# Patient Record
Sex: MALE
[De-identification: names, ages, dates, MRNs, and addresses within clinical notes are randomized per-mention and may not be internally consistent; named-entity substitution may affect disease eponyms.]

---

## 2018-11-24 ENCOUNTER — HOSPITAL ENCOUNTER (EMERGENCY)
Dept: HOSPITAL 97 - ER | Age: 83
Discharge: HOME | End: 2018-11-24
Payer: COMMERCIAL

## 2018-11-24 VITALS — SYSTOLIC BLOOD PRESSURE: 158 MMHG | OXYGEN SATURATION: 100 % | TEMPERATURE: 98.6 F | DIASTOLIC BLOOD PRESSURE: 78 MMHG

## 2018-11-24 DIAGNOSIS — J33.9: Primary | ICD-10-CM

## 2018-11-24 DIAGNOSIS — R05: ICD-10-CM

## 2018-11-24 PROCEDURE — 99282 EMERGENCY DEPT VISIT SF MDM: CPT

## 2018-11-24 NOTE — ER
Nurse's Notes                                                                                     

 [facility]                                                                

Name: Zofia Rudd Jr                                                                              

Age: 82 yrs                                                                                       

Sex: Male                                                                                         

: 1935                                                                                   

MRN: [id number]                                                                                   

Arrival Date: 2018                                                                          

Time: 15:49                                                                                       

Account#: [id number]                                                                            

Bed 13                                                                                            

Private MD:                                                                                       

Diagnosis: Cough;Nasal polyp, unspecified                                                         

                                                                                                  

Presentation:                                                                                     

                                                                                             

15:54 Presenting complaint: Patient states: When I blow my nose something pops out of my left la1 

      nostril. Transition of care: patient was not received from another setting of care.         

      Onset of symptoms was 2018. Risk Assessment: Do you want to hurt yourself      

      or someone else? Patient reports no desire to harm self or others. Initial Sepsis           

      Screen: Does the patient meet any 2 criteria? No. Patient's initial sepsis screen is        

      negative. Does the patient have a suspected source of infection? No. Patient's initial      

      sepsis screen is negative. Care prior to arrival: None.                                     

15:54 Method Of Arrival: Ambulatory                                                           la1 

15:54 Acuity: SHARA 4                                                                           la1 

                                                                                                  

Historical:                                                                                       

- Allergies:                                                                                      

15:55 No Known Allergies;                                                                     la1 

- PMHx:                                                                                           

15:55 Hypertension;                                                                           la1 

                                                                                                  

- Immunization history:: Adult Immunizations up to date.                                          

- Social history:: Smoking status: Patient/guardian denies using tobacco.                         

- Ebola Screening: : No symptoms or risks identified at this time.                                

                                                                                                  

                                                                                                  

Screenin:30 Abuse screen: Denies threats or abuse. Denies injuries from another. Nutritional        jl7 

      screening: No deficits noted. Tuberculosis screening: No symptoms or risk factors           

      identified. Fall Risk None identified.                                                      

                                                                                                  

Assessment:                                                                                       

17:30 General: Appears in no apparent distress. comfortable, Behavior is calm, cooperative,   jl7 

      appropriate for age. Pain: Denies pain. Neuro: Level of Consciousness is awake, alert,      

      obeys commands, Oriented to person, place, time, situation. Cardiovascular: Patient's       

      skin is warm and dry. Respiratory: Airway is patent Respiratory effort is even,             

      unlabored, Respiratory pattern is regular, symmetrical. EENT: Nares are clear               

      bilaterally. Derm: Skin is pink, warm \T\ dry.                                              

                                                                                                  

Vital Signs:                                                                                      

15:55  / 78; Pulse 86; Resp 16; Temp 98.6; Pulse Ox 100% on R/A; Weight 104.33 kg;      la1 

      Height 5 ft. 8 in. (172.72 cm);                                                             

15:55 Body Mass Index 34.97 (104.33 kg, 172.72 cm)                                            la1 

                                                                                                  

ED Course:                                                                                        

15:49 Patient arrived in ED.                                                                  mr  

15:55 Triage completed.                                                                       la1 

15:55 Arm band placed on left wrist.                                                          la1 

17:13 Nicole Fitzgerald FNP-C is [facility].                                                        kb  

17:13 Vasu Aviles MD is Attending Physician.                                              kb  

17:30 Christiana Mauricio, RN is Primary Nurse.                                                      jl7 

17:30 Patient has correct armband on for positive identification. Placed in gown. Bed in low  jl7 

      position. Call light in reach. Side rails up X 1.                                           

17:30 No provider procedures requiring assistance completed. Patient did not have IV access   jl7 

      during this emergency room visit.                                                           

                                                                                                  

Administered Medications:                                                                         

No medications were administered                                                                  

                                                                                                  

                                                                                                  

Outcome:                                                                                          

17:24 Discharge ordered by MD.                                                                kb  

17:30 Discharged to home ambulatory, with family.                                             jl7 

17:30 Condition: stable                                                                           

17:30 Discharge instructions given to patient, family, Instructed on discharge instructions,      

      follow up and referral plans. medication usage, Demonstrated understanding of               

      instructions, follow-up care, medications, Prescriptions given X 1.                         

17:32 Patient left the ED.                                                                    jl7 

                                                                                                  

Signatures:                                                                                       

Nicole Fitzgerald FNP-C FNP-Angella                                                   

Stephanie Smith, Tramaine, RN                         RN   la1                                                  

Christiana Mauricio, KATHY                        RN   jl7                                                  

                                                                                                  

**************************************************************************************************

## 2018-11-24 NOTE — EDPHYS
Physician Documentation                                                                           

 [facility]                                                                

Name: Zofia Rudd Jr                                                                              

Age: 82 yrs                                                                                       

Sex: Male                                                                                         

: 1935                                                                                   

MRN: [id number]                                                                                   

Arrival Date: 2018                                                                          

Time: 15:49                                                                                       

Account#: [id number]                                                                            

Bed 13                                                                                            

Private MD:                                                                                       

ED Physician Vasu Aviles                                                                       

HPI:                                                                                              

                                                                                             

17:26 This 82 yrs old  Male presents to ER via Ambulatory with complaints of Nose     kb  

      Problem.                                                                                    

17:26 The patient presents with a foreign body, unknown, located in left nare. Onset: The     kb  

      symptoms/episode began/occurred nasal obstruction for approx 3 months. Blew his nose        

      today and a piece of flesh colored tissue hung out of left nostril.. Modifying factors:     

      The symptoms are alleviated by nothing. the symptoms are aggravated by blowing nose.        

      Associated signs and symptoms: The patient has no apparent associated signs or              

      symptoms, Loss of consciousness: the patient experienced no loss of consciousness.          

      Severity of symptoms: At their worst the symptoms were mild in the emergency department     

      the symptoms are unchanged. The patient has not experienced similar symptoms in the         

      past. The patient has not recently seen a physician.                                        

                                                                                                  

Historical:                                                                                       

- Allergies:                                                                                      

15:55 No Known Allergies;                                                                     la1 

- PMHx:                                                                                           

15:55 Hypertension;                                                                           la1 

                                                                                                  

- Immunization history:: Adult Immunizations up to date.                                          

- Social history:: Smoking status: Patient/guardian denies using tobacco.                         

- Ebola Screening: : No symptoms or risks identified at this time.                                

                                                                                                  

                                                                                                  

ROS:                                                                                              

17:24 Constitutional: Negative for fever, chills, and weight loss, Neck: Negative for injury, kb  

      pain, and swelling, Cardiovascular: Negative for chest pain, palpitations, and edema,       

      Abdomen/GI: Negative for abdominal pain, nausea, vomiting, diarrhea, and constipation,      

      Back: Negative for injury and pain, MS/Extremity: Negative for injury and deformity,        

      Skin: Negative for injury, rash, and discoloration, Neuro: Negative for headache,           

      weakness, numbness, tingling, and seizure.                                                  

17:24 ENT: Positive for nasal obstruction.                                                        

17:24 Respiratory: Positive for cough, Negative for dyspnea on exertion, hemoptysis,              

      orthopnea, pleurisy, shortness of breath, sputum production, wheezing.                      

                                                                                                  

Exam:                                                                                             

17:24 Constitutional:  This is a well developed, well nourished patient who is awake, alert,  kb  

      and in no acute distress. Head/Face:  Normocephalic, atraumatic. Chest/axilla:  Normal      

      chest wall appearance and motion.  Nontender with no deformity.  No lesions are             

      appreciated. Cardiovascular:  Regular rate and rhythm with a normal S1 and S2.  No          

      gallops, murmurs, or rubs.  Normal PMI, no JVD.  No pulse deficits. Respiratory:  Lungs     

      have equal breath sounds bilaterally, clear to auscultation and percussion.  No rales,      

      rhonchi or wheezes noted.  No increased work of breathing, no retractions or nasal          

      flaring. Abdomen/GI:  Soft, non-tender, with normal bowel sounds.  No distension or         

      tympany.  No guarding or rebound.  No evidence of tenderness throughout. Skin:  Warm,       

      dry with normal turgor.  Normal color with no rashes, no lesions, and no evidence of        

      cellulitis. MS/ Extremity:  Pulses equal, no cyanosis.  Neurovascular intact.  Full,        

      normal range of motion. Neuro:  Awake and alert, GCS 15, oriented to person, place,         

      time, and situation.  Cranial nerves II-XII grossly intact.  Motor strength 5/5 in all      

      extremities.  Sensory grossly intact.  Cerebellar exam normal.  Normal gait.                

17:24 ENT: Nose: polyp noted to left nare.                                                        

                                                                                                  

Vital Signs:                                                                                      

15:55  / 78; Pulse 86; Resp 16; Temp 98.6; Pulse Ox 100% on R/A; Weight 104.33 kg;      la1 

      Height 5 ft. 8 in. (172.72 cm);                                                             

15:55 Body Mass Index 34.97 (104.33 kg, 172.72 cm)                                            la1 

                                                                                                  

MDM:                                                                                              

17:13 Patient medically screened.                                                             kb  

17:25 Data reviewed: vital signs, nurses notes. Data interpreted: Pulse oximetry: on room air kb  

      is 100 %. Interpretation: normal. Counseling: I had a detailed discussion with the          

      patient and/or guardian regarding: the historical points, exam findings, and any            

      diagnostic results supporting the discharge/admit diagnosis, the need for outpatient        

      follow up, an ENT specialist, to return to the emergency department if symptoms worsen      

      or persist or if there are any questions or concerns that arise at home.                    

                                                                                                  

Administered Medications:                                                                         

No medications were administered                                                                  

                                                                                                  

                                                                                                  

Disposition:                                                                                      

18:18 Co-signature as Attending Physician, Vasu Aviles MD I agree with the assessment and   kdr 

      plan of care.                                                                               

                                                                                                  

Disposition:                                                                                      

11/24/18 17:24 Discharged to Home. Impression: Cough, Nasal polyp, unspecified.                   

- Condition is Stable.                                                                            

- Discharge Instructions: Cough, Adult, Easy-to-Read, Nasal Polyps.                               

- Prescriptions for Tessalon Perles 100 mg Oral Capsule - take 1 capsule by ORAL route            

  every 8 hours As needed; 15 capsule.                                                            

- Medication Reconciliation Form, Thank You Letter, Antibiotic Education, Prescription            

  Opioid Use form.                                                                                

- Follow up: Emergency Department; When: As needed; Reason: Worsening of condition.               

  Follow up: Private Physician; When: 2 - 3 days; Reason: Recheck today's complaints,             

  Continuance of care, Re-evaluation by your physician.                                           

                                                                                                  

                                                                                                  

                                                                                                  

Signatures:                                                                                       

Nicole Fitzgerald, FNP-C                 FNP-Ckb                                                   

Vasu Aviles MD MD kdr Attema, Lee, RN                         RN   la1                                                  

Christiana Mauricio RN                        RN   jl7                                                  

                                                                                                  

Corrections: (The following items were deleted from the chart)                                    

17:32 17:24 2018 17:24 Discharged to Home. Impression: Cough; Nasal polyp, unspecified. jl7 

      Condition is Stable. Forms are Medication Reconciliation Form, Thank You Letter,            

      Antibiotic Education, Prescription Opioid Use. Follow up: Emergency Department; When:       

      As needed; Reason: Worsening of condition. Follow up: Private Physician; When: 2 - 3        

      days; Reason: Recheck today's complaints, Continuance of care, Re-evaluation by your        

      physician. kb                                                                               

                                                                                                  

**************************************************************************************************

## 2021-07-09 ENCOUNTER — HOSPITAL ENCOUNTER (INPATIENT)
Dept: HOSPITAL 97 - ER | Age: 86
LOS: 4 days | Discharge: HOME | DRG: 190 | End: 2021-07-13
Attending: HOSPITALIST
Payer: COMMERCIAL

## 2021-07-09 VITALS — BODY MASS INDEX: 34.8 KG/M2

## 2021-07-09 DIAGNOSIS — I13.0: ICD-10-CM

## 2021-07-09 DIAGNOSIS — N17.9: ICD-10-CM

## 2021-07-09 DIAGNOSIS — N18.4: ICD-10-CM

## 2021-07-09 DIAGNOSIS — E87.2: ICD-10-CM

## 2021-07-09 DIAGNOSIS — C90.00: ICD-10-CM

## 2021-07-09 DIAGNOSIS — E83.51: ICD-10-CM

## 2021-07-09 DIAGNOSIS — E78.5: ICD-10-CM

## 2021-07-09 DIAGNOSIS — I48.91: ICD-10-CM

## 2021-07-09 DIAGNOSIS — D63.8: ICD-10-CM

## 2021-07-09 DIAGNOSIS — Z20.822: ICD-10-CM

## 2021-07-09 DIAGNOSIS — I24.8: ICD-10-CM

## 2021-07-09 DIAGNOSIS — E66.9: ICD-10-CM

## 2021-07-09 DIAGNOSIS — J44.1: Primary | ICD-10-CM

## 2021-07-09 DIAGNOSIS — I50.31: ICD-10-CM

## 2021-07-09 LAB
ALBUMIN SERPL BCP-MCNC: 3.4 G/DL (ref 3.4–5)
ALP SERPL-CCNC: 72 U/L (ref 45–117)
ALT SERPL W P-5'-P-CCNC: 20 U/L (ref 12–78)
AST SERPL W P-5'-P-CCNC: 10 U/L (ref 15–37)
BUN BLD-MCNC: 88 MG/DL (ref 7–18)
GLUCOSE SERPLBLD-MCNC: 101 MG/DL (ref 74–106)
HCT VFR BLD CALC: 31.6 % (ref 39.6–49)
INR BLD: 1.03
LYMPHOCYTES # SPEC AUTO: 2 K/UL (ref 0.7–4.9)
MAGNESIUM SERPL-MCNC: 2.3 MG/DL (ref 1.8–2.4)
NT-PROBNP SERPL-MCNC: 4416 PG/ML (ref ?–450)
PMV BLD: 11.3 FL (ref 7.6–11.3)
POTASSIUM SERPL-SCNC: 4.4 MMOL/L (ref 3.5–5.1)
RBC # BLD: 3.4 M/UL (ref 4.33–5.43)
TROPONIN (EMERG DEPT USE ONLY): 0.1 NG/ML (ref 0–0.04)

## 2021-07-09 PROCEDURE — 80053 COMPREHEN METABOLIC PANEL: CPT

## 2021-07-09 PROCEDURE — 83970 ASSAY OF PARATHORMONE: CPT

## 2021-07-09 PROCEDURE — 81001 URINALYSIS AUTO W/SCOPE: CPT

## 2021-07-09 PROCEDURE — 96374 THER/PROPH/DIAG INJ IV PUSH: CPT

## 2021-07-09 PROCEDURE — 71045 X-RAY EXAM CHEST 1 VIEW: CPT

## 2021-07-09 PROCEDURE — 83540 ASSAY OF IRON: CPT

## 2021-07-09 PROCEDURE — 82728 ASSAY OF FERRITIN: CPT

## 2021-07-09 PROCEDURE — 80048 BASIC METABOLIC PNL TOTAL CA: CPT

## 2021-07-09 PROCEDURE — 94010 BREATHING CAPACITY TEST: CPT

## 2021-07-09 PROCEDURE — 82805 BLOOD GASES W/O2 SATURATION: CPT

## 2021-07-09 PROCEDURE — 84100 ASSAY OF PHOSPHORUS: CPT

## 2021-07-09 PROCEDURE — 84443 ASSAY THYROID STIM HORMONE: CPT

## 2021-07-09 PROCEDURE — 93005 ELECTROCARDIOGRAM TRACING: CPT

## 2021-07-09 PROCEDURE — 84484 ASSAY OF TROPONIN QUANT: CPT

## 2021-07-09 PROCEDURE — 82570 ASSAY OF URINE CREATININE: CPT

## 2021-07-09 PROCEDURE — 80076 HEPATIC FUNCTION PANEL: CPT

## 2021-07-09 PROCEDURE — 80061 LIPID PANEL: CPT

## 2021-07-09 PROCEDURE — 93306 TTE W/DOPPLER COMPLETE: CPT

## 2021-07-09 PROCEDURE — 85025 COMPLETE CBC W/AUTO DIFF WBC: CPT

## 2021-07-09 PROCEDURE — 76770 US EXAM ABDO BACK WALL COMP: CPT

## 2021-07-09 PROCEDURE — 84466 ASSAY OF TRANSFERRIN: CPT

## 2021-07-09 PROCEDURE — 83880 ASSAY OF NATRIURETIC PEPTIDE: CPT

## 2021-07-09 PROCEDURE — 36415 COLL VENOUS BLD VENIPUNCTURE: CPT

## 2021-07-09 PROCEDURE — 82435 ASSAY OF BLOOD CHLORIDE: CPT

## 2021-07-09 PROCEDURE — 84156 ASSAY OF PROTEIN URINE: CPT

## 2021-07-09 PROCEDURE — 82652 VIT D 1 25-DIHYDROXY: CPT

## 2021-07-09 PROCEDURE — 84550 ASSAY OF BLOOD/URIC ACID: CPT

## 2021-07-09 PROCEDURE — 99285 EMERGENCY DEPT VISIT HI MDM: CPT

## 2021-07-09 PROCEDURE — 84300 ASSAY OF URINE SODIUM: CPT

## 2021-07-09 PROCEDURE — 82306 VITAMIN D 25 HYDROXY: CPT

## 2021-07-09 PROCEDURE — 82274 ASSAY TEST FOR BLOOD FECAL: CPT

## 2021-07-09 PROCEDURE — 83735 ASSAY OF MAGNESIUM: CPT

## 2021-07-09 PROCEDURE — 84132 ASSAY OF SERUM POTASSIUM: CPT

## 2021-07-09 PROCEDURE — 94640 AIRWAY INHALATION TREATMENT: CPT

## 2021-07-09 PROCEDURE — 85379 FIBRIN DEGRADATION QUANT: CPT

## 2021-07-09 PROCEDURE — 85027 COMPLETE CBC AUTOMATED: CPT

## 2021-07-09 PROCEDURE — 80069 RENAL FUNCTION PANEL: CPT

## 2021-07-09 PROCEDURE — 84439 ASSAY OF FREE THYROXINE: CPT

## 2021-07-09 PROCEDURE — 85730 THROMBOPLASTIN TIME PARTIAL: CPT

## 2021-07-09 PROCEDURE — 85610 PROTHROMBIN TIME: CPT

## 2021-07-09 PROCEDURE — 96375 TX/PRO/DX INJ NEW DRUG ADDON: CPT

## 2021-07-09 NOTE — RAD REPORT
EXAM DESCRIPTION:  Russ Single View7/9/2021 8:26 pm

 

CLINICAL HISTORY:  Wheezing

 

COMPARISON:  2014

 

FINDINGS:   The lungs appear clear of acute infiltrate. The heart is borderline enlarged

 

A moderate hiatal hernia

 

IMPRESSION:   No acute abnormalities displayed

## 2021-07-09 NOTE — XMS REPORT
Continuity of Care Document

                             Created on:2021



Patient:JULIO RUDD

Sex:Male

:1935

External Reference #:[id number]





Demographics







                          Address                   [address] [place] [address]

 

                          Home Phone                [phone] WIFE

 

                          Mobile Phone              [phone]

 

                          Email Address             [email or web address]

 

                          Preferred Language        English

 

                          Marital Status            Unknown

 

                          Hinduism Affiliation     Unknown

 

                          Race                      Unknown

 

                          Additional Race(s)        Unavailable



                                                    Unavailable



                                                    White

 

                          Ethnic Group              Unknown









Author







                          Organization              [facility]

t

 

                          Address                   [address]



                                                    [place] [address]

 

                          Phone                     [phone]









Support







                Name            Relationship    Address         Phone

 

                Niecy Rudd    Spouse          Unavailable     [phone]









Care Team Providers







                    Name                Role                Phone

 

                    Only, Adc Test      Attending Clinician Unavailable

 

                    Angelica RN             Attending Clinician Unavailable









Problems

This patient has no known problems.



Allergies, Adverse Reactions, Alerts

This patient has no known allergies or adverse reactions.



Medications

This patient has no known medications.



Procedures

This patient has no known procedures.



Encounters







        Start   End     Encounter Admission Attending Care    Care    Encounter 

Source



        Date/Time Date/Time Type    Type    Clinicians Facility Department ID   

   

 

        2021 Outpatient                 [facility]    [facility]   

 [facility]



        08:39:00 08:39:00                                                 

 

        2020 Laboratory         Only, [facility]    [id number]

[id number] 



        12:19:11 12:34:11 Only            Test    [facility] [id number]         



                                                [place] [id number]         



                                                [place]  [phone]         



                                                        353             

 

        2020 Letter          Meghan Dominguez    [id number]

[id number] 



        00:00:00 00:00:00 (Out)                   FRIEDA   [id number]         



                                                [facility] [id number]         



                                                        [phone]         



                                                        019             







Results

This patient has no known results.

## 2021-07-09 NOTE — ER
Nurse's Notes                                                                                     

 [facility]                                                                 

Name: Zofia Rudd Jr                                                                              

Age: 85 yrs                                                                                       

Sex: Male                                                                                         

: 1935                                                                                   

MRN: [id number]                                                                                   

Arrival Date: 2021                                                                          

Time: 17:50                                                                                       

Account#: [id number]                                                                            

Bed 8                                                                                             

Private MD:                                                                                       

Diagnosis: Acute Kidney Injury;Dyspnea;Elevated Troponin                                          

                                                                                                  

Presentation:                                                                                     

                                                                                             

18:29 Chief complaint: Patient states: Been feeling shortness of breath and cough for about a vg1 

      week and wheezing began last night. Coronavirus screen: Client denies travel out of the     

      [place] in the last 14 days. Coronavirus screen: Client presents with at least one sign or     

      symptom that may indicate coronavirus-19. Standard/surgical mask placed on the client.      

      Ebola Screen: Patient negative for fever greater than or equal to 101.5 degrees             

      Fahrenheit, and additional compatible Ebola Virus Disease symptoms. Initial Sepsis          

      Screen: Does the patient meet any 2 criteria? No. Patient's initial sepsis screen is        

      negative. Does the patient have a suspected source of infection? No. Patient's initial      

      sepsis screen is negative. Risk Assessment: Do you want to hurt yourself or someone         

      else? Patient reports no desire to harm self or others. Onset of symptoms was 2021.                                                                                       

18:29 Method Of Arrival: Ambulatory                                                           vg1 

18:29 Acuity: SHARA 3                                                                           vg1 

                                                                                                  

Triage Assessment:                                                                                

18:32 General: Appears in no apparent distress. comfortable, Behavior is calm, cooperative.   vg1 

      Pain: Denies pain. Respiratory: Reports shortness of breath Breath sounds are               

      diminished in left posterior lower lobe Onset: The symptoms/episode began/occurred          

      today, the patient has mild shortness of breath.                                            

                                                                                                  

Historical:                                                                                       

- Allergies:                                                                                      

18:32 No Known Allergies;                                                                     vg1 

- Home Meds:                                                                                      

18:32 Revlimid oral [Active]; Valacyclovir Oral [Active]; Metoprolol Tartrate Oral [Active];  vg1 

      amlodipine oral [Active]; losartan oral [Active]; Hydrochlorothiazide Oral [Active];        

      Omeprazole Oral [Active];                                                                   

- PMHx:                                                                                           

18:32 Hypertension; Chemotherapy; Multiple Myeloma;                                           vg1 

                                                                                                  

- Immunization history:: Adult Immunizations up to date, Client reports receiving the             

  2nd dose of the Covid vaccine.                                                                  

- Social history:: Smoking status: Patient denies any tobacco usage or history of.                

                                                                                                  

                                                                                                  

Screenin:37 Abuse screen: Denies threats or abuse. Denies injuries from another. Nutritional        rr5 

      screening: No deficits noted. Tuberculosis screening: No symptoms or risk factors           

      identified. Fall Risk None identified. Total Wells Fall Scale indicates No Risk (0-24       

      pts).                                                                                       

                                                                                                  

Assessment:                                                                                       

19:37 General: Appears in no apparent distress. comfortable, Behavior is calm, cooperative,   rr5 

      appropriate for age. Pain: Denies pain. Neuro: Level of Consciousness is awake, alert,      

      obeys commands, Oriented to person, place, time. Cardiovascular: Capillary refill < 3       

      seconds Patient's skin is warm and dry. Rhythm is regular. Respiratory: Airway is           

      patent Respiratory effort is even, unlabored, Respiratory pattern is regular,               

      symmetrical, Parent/caregiver reports the patient having wheezing. Derm: Skin               

      temperature is warm. Musculoskeletal: Capillary refill < 3 seconds.                         

20:57 Reassessment: Patient and/or family updated on plan of care and expected duration. Pain ak2 

      level reassessed.                                                                           

22:26 Reassessment: Patient and/or family updated on plan of care and expected duration. Pain ak2 

      level reassessed.                                                                           

23:11 Reassessment: hospitalist informed D dimer 5858.                                        rr5 

                                                                                                  

Vital Signs:                                                                                      

18:29  / 62; Pulse 72; Resp 20; Temp 97.2; Pulse Ox 99% ; Weight 100.7 kg; Height 5 ft. vg1 

      8 in. (172.72 cm); Pain 0/10;                                                               

20:56  / 68; Pulse 76; Resp 20; Pulse Ox 100% on R/A;                                   ak2 

22:27  / 75; Pulse 71; Resp 20; Pulse Ox 100% on R/A;                                   ak2 

18:29 Body Mass Index 33.75 (100.70 kg, 172.72 cm)                                            vg1 

                                                                                                  

ED Course:                                                                                        

17:50 Patient arrived in ED.                                                                  as  

18:31 Triage completed.                                                                       vg1 

18:32 Arm band placed on Patient placed in waiting room, Patient notified of wait time.       vg1 

19:27 Owen Watt is Primary Nurse.                                                      ak2 

19:34 Sarkis Howe PA is PHCP.                                                              [facility] 

19:34 Kirill Tolbert MD is Attending Physician.                                             jm 

19:46 Patient has correct armband on for positive identification.                             ak2 

19:46 No provider procedures requiring assistance completed.                                  ak2 

20:26 Chest Single View XRAY In Process Unspecified.                                          EDMS

20:32 Missed attempt(s): 20 gauge in right forearm. Bleeding controlled, band aid applied,    oe  

      catheter tip intact.                                                                        

20:34 Inserted saline lock: 20 gauge in left antecubital area, using aseptic technique. Blood oe  

      collected.                                                                                  

22:57 Tom Casiano DO is Hospitalizing Provider.                                           keith 

                                                                                                  

Administered Medications:                                                                         

20:37 Drug: SOLU-Medrol (methylPrednisoLONE) 125 mg Route: IVP; Site: left antecubital;       ak2 

20:38 Drug: Xopenex (levalbuterol) (3) 1.25 mg Route: Inhalation;                             ak2 

23:22 Dru grams of (Calcium Gluconate 1 grams, NS 0.9% 100 ml) Route: IVPB; Infused Over: rr5 

      60 mins; Site: left antecubital;                                                            

23:22 Drug: Albuterol - atroVENT (ipratropium) (3:1) (2.5 mg - 0.5 mg) 3 ml Route: Nebulizer; rr5 

                                                                                                  

                                                                                                  

Outcome:                                                                                          

22:57 Decision to Hospitalize by Provider.                                                    keith 

07/10                                                                                             

00:05 Admitted to Tele                                                                        ak2 

      Condition: good                                                                             

00:05 Patient left the ED.                                                                    ak2 

                                                                                                  

Signatures:                                                                                       

Dispatcher [employer]                           EDMS                                                 

Sarkis Howe PA PA jmm Martinez, Amelia as Espinosa, Orlando oe Roque, Raymond, RN                      RN   rr5                                                  

Zeina Martins RN                    RN   vg1                                                  

Owen Watt                             ak2                                                  

                                                                                                  

Corrections: (The following items were deleted from the chart)                                    

                                                                                             

18:32 18:29 Chief complaint: Patient states: Been feeling shortness of breath and cough for   vg1 

      about a week and wheezing began last night. vg1                                             

                                                                                                  

**************************************************************************************************

## 2021-07-09 NOTE — P.HP
Certification for Inpatient


Patient admitted to: Inpatient


With expected LOS: >2 Midnights


Patient will require the following post-hospital care: None


Practitioner: I am a practitioner with admitting privileges, knowledge of 

patient current condition, hospital course, and medical plan of care.


Services: Services provided to patient in accordance with Admission requirements

found in Title 42 Section 412.3 of the Code of Federal Regulations





Patient History


Date of Service: 07/09/21


Primary Care Provider: DR. Davies, Dr. alexander


Reason for admission: COPD exacerbation, JAREK


History of Present Illness: 


85-year-old  male with history of multiple myeloma, CKD 4, COPD, anemia 

chronic disease, hypertension presents emergency department for shortness of 

breath.  Patient evaluated in the emergency department found to be having 

expiratory wheezing, dyspnea, tachypnea.  Patient given steroids/nebulizer 

treatments.  Labs significant for hemoglobin 10.8 hematocrit 31.6 D-dimer 5858 

calcium 6.8, albumin 3.4, chloride 108, CO2 20, BUN 88 creatinine 3.41 GFR 17, 

patient's baseline GFR appears to be in the high 20s 





Allergies





No Known Allergies Allergy (Unverified 07/09/21 23:47)


   








- Past Medical/Surgical History


-: Multiple myeloma


-: CKD 4


-: Hypertension


-: COPD


-: Hyperlipidemia


-: Anemia of chronic disease


-: Prostatectomy


-: Cholecystectomy


Psychosocial/ Personal History: Retired, lives with wife





- Family History


  ** Father


-: Heart disease





  ** Sister


-: Diabetes





- Social History


Smoking Status: Former smoker


Alcohol use: No


CD- Drugs: No


Caffeine use: Yes


Place of Residence: Home





Review of Systems


10-point ROS is otherwise unremarkable


Respiratory: Shortness of Breath, Wheezing





Physical Examination





- Physical Exam


General: Alert, In no apparent distress, Oriented x3


HEENT: Atraumatic, PERRLA, Mucous membr. moist/pink


Neck: Supple, 2+ carotid pulse no bruit, No LAD


Respiratory: Expiratory wheezes, Other (Dyspnea, tachypnea)


Cardiovascular: Regular rate/rhythm, Normal S1 S2


Gastrointestinal: Normal bowel sounds, No tenderness


Musculoskeletal: No tenderness


Integumentary: No rashes


Neurological: Normal speech, Normal strength at 5/5 x4 extr, Normal tone, Normal

affect





- Studies


Laboratory Data (last 24 hrs)





07/09/21 20:29: PT 11.9, INR 1.03


07/09/21 20:29: WBC 10.60, Hgb 10.8 L, Hct 31.6 L, Plt Count 146 L


07/09/21 20:29: Sodium 140, Potassium 4.4, BUN 88 H, Creatinine 3.41 H, Glucose 

101, Magnesium 2.3, Total Bilirubin 0.2, AST 10 L, ALT 20, Alkaline Phosphatase 

72








Assessment and Plan





- Plan


Assessment


Dyspnea, expiratory wheezing secondary to COPD with exacerbation


Acute worsening of CKD 4 with mild elevation in troponin and BNP


Multiple myeloma


Hypertension


Hyperlipidemia








Plan


Dyspnea, expiratory wheezing secondary to COPD with exacerbation:  Continue with

scheduled nebs, IV steroids, steroid inhaler, incentive spirometry.  Daily room 

air saturations.  DVT prophylaxis with heparin 5000 units subcutaneous twice 

daily.


Acute worsening of CKD 4 with mild elevation in troponin and BNP:  Nephrology 

consulted, continue gentle hydration throughout the evening, trend troponins.  

Appreciate further input from nephrology.  Renal ultrasound ordered.


Multiple myeloma:  Patient receives weekly chemotherapy injections on Tuesdays. 

Started this last Tuesday.  Patient sees Dr. Alexander


Hypertension:  Stable continue home meds


Hyperlipidemia:  Stable continue home meds


Discharge Plan: Home


Plan to discharge in: 48 Hours





- Advance Directives


Does patient have a Living Will: No


Does patient have a Durable POA for Healthcare: No





- Code Status/Comfort Care


Code Status Assessed: Yes (Full code)


Critical Care: No


Time Spent Managing Pts Care (In Minutes): 55

## 2021-07-09 NOTE — EDPHYS
Physician Documentation                                                                           

 [facility]                                                                 

Name: Zofia Rudd Jr                                                                              

Age: 85 yrs                                                                                       

Sex: Male                                                                                         

: 1935                                                                                   

MRN: [id number]                                                                                   

Arrival Date: 2021                                                                          

Time: 17:50                                                                                       

Account#: [id number]                                                                            

Bed 8                                                                                             

Private MD:                                                                                       

ED Physician Kirill Tolbert                                                                      

HPI:                                                                                              

                                                                                             

19:39 This 85 yrs old  Male presents to ER via Ambulatory with complaints of Wheezing jmm 

      > 1 Year.                                                                                   

19:39 The patient presents to the emergency department with wheezing, Current therapy:        [facility] 

      steroid inhaler. Onset: The symptoms/episode began/occurred gradually, today. Modifying     

      factors: The symptoms are alleviated by nothing, the symptoms are aggravated by             

      nothing. Associated signs and symptoms: Pertinent negatives: fever. Denies chest pain.      

                                                                                                  

Historical:                                                                                       

- Allergies:                                                                                      

18:32 No Known Allergies;                                                                     vg1 

- Home Meds:                                                                                      

18:32 Revlimid oral [Active]; Valacyclovir Oral [Active]; Metoprolol Tartrate Oral [Active];  vg1 

      amlodipine oral [Active]; losartan oral [Active]; Hydrochlorothiazide Oral [Active];        

      Omeprazole Oral [Active];                                                                   

- PMHx:                                                                                           

18:32 Hypertension; Chemotherapy; Multiple Myeloma;                                           vg1 

                                                                                                  

- Immunization history:: Adult Immunizations up to date, Client reports receiving the             

  2nd dose of the Covid vaccine.                                                                  

- Social history:: Smoking status: Patient denies any tobacco usage or history of.                

                                                                                                  

                                                                                                  

ROS:                                                                                              

19:39 Constitutional: Negative for fever, chills, and weight loss, Cardiovascular: Negative   jmm 

      for chest pain, palpitations, and edema.                                                    

19:39 Respiratory: Positive for cough, shortness of breath.                                       

19:39 All other systems are negative.                                                             

                                                                                                  

Exam:                                                                                             

19:39 Constitutional:  This is a well developed, well nourished patient who is awake, alert,  jmm 

      and in no acute distress. Head/Face:  atraumatic. Eyes:  EOMI, no conjunctival erythema     

      appreciated ENT:  Moist Mucus Membranes Neck:  Trachea midline, Supple Chest/axilla:        

      Normal chest wall appearance and motion.   Cardiovascular:  Regular rate and rhythm.        

      No edema appreciated Respiratory:  Normal respirations, no respiratory distress             

      appreciated Abdomen/GI:  Non distended, soft Back:  Normal ROM Skin:  General               

      appearance color normal MS/ Extremity:  Moves all extremities, no obvious deformities       

      appreciated, no edema noted to the lower extremities  Neuro:  Awake and alert, normal       

      gait Psych:  Behavior is normal, Mood is normal, Patient is cooperative and pleasant        

                                                                                                  

Vital Signs:                                                                                      

18:29  / 62; Pulse 72; Resp 20; Temp 97.2; Pulse Ox 99% ; Weight 100.7 kg; Height 5 ft. vg1 

      8 in. (172.72 cm); Pain 0/10;                                                               

20:56  / 68; Pulse 76; Resp 20; Pulse Ox 100% on R/A;                                   ak2 

22:27  / 75; Pulse 71; Resp 20; Pulse Ox 100% on R/A;                                   ak2 

18:29 Body Mass Index 33.75 (100.70 kg, 172.72 cm)                                            vg1 

                                                                                                  

MDM:                                                                                              

20:10 Patient medically screened.                                                             [facility] 

22:55 Data reviewed: vital signs, nurses notes. Counseling: I had a detailed discussion with  keith 

      the patient and/or guardian regarding: the historical points, exam findings, and any        

      diagnostic results supporting the discharge/admit diagnosis, lab results, radiology         

      results, the need for further work-up and treatment in the hospital. ED course: I           

      discussed the patient with Tramaine Morin whom accepted the patient for admission. .            

                                                                                                  

                                                                                             

20:10 Order name: Basic Metabolic Panel; Complete Time: 21:16                                 [facility] 

                                                                                             

20:10 Order name: CBC with Diff; Complete Time: 20:42                                         [facility] 

                                                                                             

20:10 Order name: LFT's; Complete Time: 21:16                                                 [facility] 

                                                                                             

20:10 Order name: Magnesium; Complete Time: 21:16                                             [facility] 

                                                                                             

20:10 Order name: NT PRO-BNP; Complete Time: 21:16                                            [facility] 

                                                                                             

20:10 Order name: PT-INR; Complete Time: 21:16                                                [facility] 

                                                                                             

19:38 Order name: Chest Single View XRAY; Complete Time: 20:53                                rr5 

                                                                                             

20:10 Order name: Troponin (emerg Dept Use Only); Complete Time: 21:16                        [facility] 

                                                                                             

22:40 Order name: SARS-COV-2 RT PCR; Complete Time: 22:42                                     [facility]

                                                                                             

22:42 Order name: DD; Complete Time: 23:16                                                    la1 

                                                                                             

20:10 Order name: EKG; Complete Time: 20:11                                                   [facility] 

                                                                                             

20:10 Order name: Cardiac monitoring; Complete Time: 20:57                                    [facility] 

                                                                                             

20:10 Order name: EKG - Nurse/Tech; Complete Time: 20:57                                      [facility] 

                                                                                             

20:10 Order name: IV Saline Lock; Complete Time: 20:35                                        [facility] 

                                                                                             

20:10 Order name: Labs collected and sent; Complete Time: 20:35                               [facility] 

                                                                                             

20:10 Order name: O2 Per Protocol; Complete Time: 20:57                                       [facility] 

                                                                                             

20:10 Order name: O2 Sat Monitoring; Complete Time: 20:57                                     [facility] 

                                                                                                  

Administered Medications:                                                                         

20:37 Drug: SOLU-Medrol (methylPrednisoLONE) 125 mg Route: IVP; Site: left antecubital;       ak2 

20:38 Drug: Xopenex (levalbuterol) (3) 1.25 mg Route: Inhalation;                             ak2 

23:22 Dru grams of (Calcium Gluconate 1 grams, NS 0.9% 100 ml) Route: IVPB; Infused Over: rr5 

      60 mins; Site: left antecubital;                                                            

23:22 Drug: Albuterol - atroVENT (ipratropium) (3:1) (2.5 mg - 0.5 mg) 3 ml Route: Nebulizer; rr5 

                                                                                                  

                                                                                                  

Disposition:                                                                                      

07/10                                                                                             

06:27 Co-signature as Attending Physician, Kirill Tolbert MD.                                 7 

                                                                                                  

Disposition Summary:                                                                              

21 22:57                                                                                    

Hospitalization Ordered                                                                           

      Hospitalization Status: Observation                                                     [facility] 

      Provider: Tom Casiano 

      Location: Telemetry/MedSurg (observation)                                               [facility] 

      Condition: Stable                                                                       [facility] 

      Problem: new                                                                            [facility] 

      Symptoms: are unchanged                                                                 [facility] 

      Bed/Room Type: Standard                                                                 [facility] 

      Room Assignment: 204(21 23:33)                                                      

      Diagnosis                                                                                   

        - Acute Kidney Injury                                                                 m 

        - Dyspnea                                                                             m 

        - Elevated Troponin                                                                   [facility] 

      Forms:                                                                                      

        - Medication Reconciliation Form                                                      m 

        - SBAR form                                                                           [facility] 

Signatures:                                                                                       

Dispatcher [employer]                           EDSarkis Mora PA PA   [facility]                                                  

Tramaine Morin FNP-C                      [facility]Cla[facility]                                                  

Lillian Martins, RN                       RN   cg                                                   

Talon Smith RN                      RN   rr5                                                  

Zeina Martins RN                    RN   1                                                  

Kirill Tolbert MD MD   [facility]                                                  

Owen Watt                             [facility]                                                  

                                                                                                  

Corrections: (The following items were deleted from the chart)                                    

07/09                                                                                             

21:36 21:19 CORONAVIRUS+[id number].BRZ ordered. EDMS                                              EDMS

23:33 22:57 [facility]  

                                                                                                  

**************************************************************************************************

## 2021-07-10 LAB
ALBUMIN SERPL BCP-MCNC: 2.9 G/DL (ref 3.4–5)
ALP SERPL-CCNC: 52 U/L (ref 45–117)
ALT SERPL W P-5'-P-CCNC: 19 U/L (ref 12–78)
AST SERPL W P-5'-P-CCNC: 9 U/L (ref 15–37)
BLD SMEAR INTERP: (no result)
BUN BLD-MCNC: 88 MG/DL (ref 7–18)
BUN BLD-MCNC: 92 MG/DL (ref 7–18)
CHLORIDE UR-SCNC: 15 MMOL/L (ref 25–40)
COHGB MFR BLDA: 0.9 % (ref 0–1.5)
CREAT UR-SCNC: 68 MG/DL (ref 20–370)
GLUCOSE SERPLBLD-MCNC: 212 MG/DL (ref 74–106)
GLUCOSE SERPLBLD-MCNC: 247 MG/DL (ref 74–106)
HCT VFR BLD CALC: 28.1 % (ref 39.6–49)
HDLC SERPL-MCNC: 40 MG/DL (ref 40–60)
INR BLD: 1.11
LDLC SERPL CALC-MCNC: 58 MG/DL (ref ?–130)
LYMPHOCYTES # SPEC AUTO: 0.4 K/UL (ref 0.7–4.9)
MAGNESIUM SERPL-MCNC: 2.1 MG/DL (ref 1.8–2.4)
MAGNESIUM SERPL-MCNC: 2.1 MG/DL (ref 1.8–2.4)
MORPHOLOGY BLD-IMP: (no result)
OXYHGB MFR BLDA: 94 % (ref 94–97)
PMV BLD: 11.9 FL (ref 7.6–11.3)
POTASSIUM SERPL-SCNC: 4.6 MMOL/L (ref 3.5–5.1)
POTASSIUM SERPL-SCNC: 4.7 MMOL/L (ref 3.5–5.1)
POTASSIUM UR-SCNC: 36 MMOL/L (ref 20–40)
PROT UR-MCNC: 14 MG/DL (ref ?–11.9)
RBC # BLD: 2.99 M/UL (ref 4.33–5.43)
SAO2 % BLDA: 95.8 % (ref 92–98.5)
SODIUM UR-SCNC: 11 MMOL/L (ref 27–287)
TROPONIN I: 0.05 NG/ML (ref 0–0.04)
TSH SERPL DL<=0.05 MIU/L-ACNC: 0.94 UIU/ML (ref 0.36–3.74)
URATE SERPL-MCNC: 6.9 MG/DL (ref 3.5–7.2)
URINE UROTHELIAL CELLS: <5 /HPF

## 2021-07-10 RX ADMIN — IPRATROPIUM BROMIDE SCH MG: 0.5 SOLUTION RESPIRATORY (INHALATION) at 07:53

## 2021-07-10 RX ADMIN — SODIUM BICARBONATE TAB 325 MG SCH MG: 325 TAB at 13:32

## 2021-07-10 RX ADMIN — IPRATROPIUM BROMIDE SCH MG: 0.5 SOLUTION RESPIRATORY (INHALATION) at 13:22

## 2021-07-10 RX ADMIN — SODIUM CHLORIDE SCH MLS: 0.9 INJECTION, SOLUTION INTRAVENOUS at 01:02

## 2021-07-10 RX ADMIN — ALBUTEROL SULFATE SCH MG: 2.5 SOLUTION RESPIRATORY (INHALATION) at 13:22

## 2021-07-10 RX ADMIN — Medication SCH EA: at 20:34

## 2021-07-10 RX ADMIN — PANTOPRAZOLE SODIUM SCH MG: 40 TABLET, DELAYED RELEASE ORAL at 09:12

## 2021-07-10 RX ADMIN — SODIUM BICARBONATE TAB 325 MG SCH MG: 325 TAB at 20:30

## 2021-07-10 RX ADMIN — ALBUTEROL SULFATE SCH MG: 2.5 SOLUTION RESPIRATORY (INHALATION) at 02:10

## 2021-07-10 RX ADMIN — HEPARIN SODIUM AND DEXTROSE PRN MLS: 5000; 5 INJECTION INTRAVENOUS at 20:41

## 2021-07-10 RX ADMIN — ALBUTEROL SULFATE SCH: 2.5 SOLUTION RESPIRATORY (INHALATION) at 02:00

## 2021-07-10 RX ADMIN — ALBUTEROL SULFATE SCH MG: 2.5 SOLUTION RESPIRATORY (INHALATION) at 07:53

## 2021-07-10 RX ADMIN — METHYLPREDNISOLONE SODIUM SUCCINATE SCH MG: 40 INJECTION, POWDER, FOR SOLUTION INTRAMUSCULAR; INTRAVENOUS at 09:10

## 2021-07-10 RX ADMIN — METHYLPREDNISOLONE SODIUM SUCCINATE SCH MG: 40 INJECTION, POWDER, FOR SOLUTION INTRAMUSCULAR; INTRAVENOUS at 01:06

## 2021-07-10 RX ADMIN — SODIUM BICARBONATE TAB 325 MG SCH MG: 325 TAB at 09:16

## 2021-07-10 RX ADMIN — ALBUTEROL SULFATE SCH: 2.5 SOLUTION RESPIRATORY (INHALATION) at 20:00

## 2021-07-10 RX ADMIN — SODIUM CHLORIDE SCH: 0.9 INJECTION, SOLUTION INTRAVENOUS at 13:07

## 2021-07-10 RX ADMIN — SODIUM BICARBONATE TAB 325 MG SCH MG: 325 TAB at 16:07

## 2021-07-10 RX ADMIN — IPRATROPIUM BROMIDE SCH MG: 0.5 SOLUTION RESPIRATORY (INHALATION) at 02:10

## 2021-07-10 RX ADMIN — ALBUTEROL SULFATE SCH MG: 2.5 SOLUTION RESPIRATORY (INHALATION) at 20:20

## 2021-07-10 RX ADMIN — Medication SCH: at 09:00

## 2021-07-10 RX ADMIN — PRAZOSIN HYDROCHLORIDE SCH MG: 1 CAPSULE ORAL at 20:31

## 2021-07-10 RX ADMIN — IPRATROPIUM BROMIDE SCH MG: 0.5 SOLUTION RESPIRATORY (INHALATION) at 20:20

## 2021-07-10 RX ADMIN — SODIUM CHLORIDE SCH MLS: 0.9 INJECTION, SOLUTION INTRAVENOUS at 16:07

## 2021-07-10 RX ADMIN — Medication SCH ML: at 20:34

## 2021-07-10 RX ADMIN — Medication SCH ML: at 09:00

## 2021-07-10 NOTE — RAD REPORT
EXAM DESCRIPTION:  US - Renal Ultrasound-Complete - 7/10/2021 7:36 am

 

CLINICAL HISTORY:  lisa/ckd

 

COMPARISON:  CT HEAD SPINE CAP W CONTRAST dated 9/20/2014

 

FINDINGS:  The right kidney measures approximately 9.5 x 5.0 cm.  The left kidney measures approximat
ely 10.0 x 5.0 centimeter. Renal cortical thickness and echogenicity are normal. No hydronephrosis or
 suspicious renal mass. A 4 centimeter diameter thin-walled anechoic cyst is present anterior lower p
ole right kidney. No suspicious characteristics. This measures smaller in diameter than on the 2014 s
tudy. Upper pole left kidney shows an 18 millimeter cyst with a 14 millimeter cyst in the mid to lowe
r pole.

 

Bladder was contracted and could not be visualized adequately for assessment.

 

 

IMPRESSION:  No hydronephrosis or suspicious renal mass.

 

Bilateral renal cysts are present with no suspicious characteristics.

## 2021-07-10 NOTE — P.PN
Subjective


Date of Service: 07/10/21


Primary Care Provider: DR. Davies, Dr. alexander


Chief Complaint: COPD exacerbation, JAREK


Subjective: Improving (feeling better / breathing more comfortably, reports some

urinary pressure however not making much urine. no wheeze/cough this morning. on

room air. denies nausea/vomiting)





Review of Systems


10-point ROS is otherwise unremarkable





Physical Examination





- Vital Signs


Temperature: 97.5 F


Blood Pressure: 102/56


Pulse: 104


Respirations: 20


Pulse Ox (%): 93





- Studies


Laboratory Data (last 24 hrs)





07/09/21 20:29: PT 11.9, INR 1.03


07/09/21 20:29: WBC 10.60, Hgb 10.8 L, Hct 31.6 L, Plt Count 146 L


07/09/21 20:29: Sodium 140, Potassium 4.4, BUN 88 H, Creatinine 3.41 H, Glucose 

101, Magnesium 2.3, Total Bilirubin 0.2, AST 10 L, ALT 20, Alkaline Phosphatase 

72








Assessment & Plan


Physician Review Additional Text: 


Physical Exam


General: Alert, In no apparent distress, Oriented x3


HEENT: normal conjunctiva, sclera anicteric


Respiratory: nonlabored on room air, no wheeze/crackles


Cardiovascular: Regular rate/rhythm, Normal S1 S2


Gastrointestinal: soft, nontender, nondistended


Musculoskeletal: No tenderness, no swelling


Integumentary: No rashes





Problem List


Dyspnea, expiratory wheezing secondary to acute on chronic COPD exacerbation vs 

bronchitis


metabolic alkalosis


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


elevated troponin


Multiple myeloma


Hypertension


Hyperlipidemia





-breathing improved, continue steroids, nebs, inhaler, IS. on RA


-JAREK on CKD4, low urine output, continue gentle IVF, nephrology consulted for 

further assistance


-alkalosis likely secondary to renal disease, bicarb ordered


-on active chemotherapy for multiple myeloma


-continue home medications


-trend troponin, likely demand ischemia and low elimination due to renal disease





VTE: heparin sq


Code: full


Dispo: anticipate dc home in 48hrs





Time Spent Managing Pts Care (In Minutes): 35

## 2021-07-11 LAB
ALBUMIN SERPL BCP-MCNC: 3 G/DL (ref 3.4–5)
ALP SERPL-CCNC: 45 U/L (ref 45–117)
ALT SERPL W P-5'-P-CCNC: 19 U/L (ref 12–78)
AST SERPL W P-5'-P-CCNC: 10 U/L (ref 15–37)
BUN BLD-MCNC: 89 MG/DL (ref 7–18)
GLUCOSE SERPLBLD-MCNC: 178 MG/DL (ref 74–106)
HCT VFR BLD CALC: 28.3 % (ref 39.6–49)
LYMPHOCYTES # SPEC AUTO: 0.5 K/UL (ref 0.7–4.9)
MAGNESIUM SERPL-MCNC: 2.1 MG/DL (ref 1.8–2.4)
MORPHOLOGY BLD-IMP: (no result)
PMV BLD: 12.1 FL (ref 7.6–11.3)
POTASSIUM SERPL-SCNC: 4.5 MMOL/L (ref 3.5–5.1)
RBC # BLD: 3.04 M/UL (ref 4.33–5.43)

## 2021-07-11 RX ADMIN — Medication SCH EA: at 21:03

## 2021-07-11 RX ADMIN — IPRATROPIUM BROMIDE SCH MG: 0.5 SOLUTION RESPIRATORY (INHALATION) at 08:35

## 2021-07-11 RX ADMIN — SODIUM BICARBONATE TAB 325 MG SCH MG: 325 TAB at 21:02

## 2021-07-11 RX ADMIN — SODIUM BICARBONATE TAB 325 MG SCH MG: 325 TAB at 12:20

## 2021-07-11 RX ADMIN — Medication SCH EA: at 08:59

## 2021-07-11 RX ADMIN — SODIUM BICARBONATE TAB 325 MG SCH MG: 325 TAB at 08:56

## 2021-07-11 RX ADMIN — SODIUM CHLORIDE SCH: 0.9 INJECTION, SOLUTION INTRAVENOUS at 04:20

## 2021-07-11 RX ADMIN — IPRATROPIUM BROMIDE SCH MG: 0.5 SOLUTION RESPIRATORY (INHALATION) at 02:45

## 2021-07-11 RX ADMIN — IPRATROPIUM BROMIDE SCH MG: 0.5 SOLUTION RESPIRATORY (INHALATION) at 13:50

## 2021-07-11 RX ADMIN — ALBUTEROL SULFATE SCH: 2.5 SOLUTION RESPIRATORY (INHALATION) at 14:00

## 2021-07-11 RX ADMIN — ALBUTEROL SULFATE SCH: 2.5 SOLUTION RESPIRATORY (INHALATION) at 02:00

## 2021-07-11 RX ADMIN — APIXABAN SCH MG: 2.5 TABLET, FILM COATED ORAL at 21:02

## 2021-07-11 RX ADMIN — IPRATROPIUM BROMIDE SCH MG: 0.5 SOLUTION RESPIRATORY (INHALATION) at 20:15

## 2021-07-11 RX ADMIN — Medication SCH ML: at 08:57

## 2021-07-11 RX ADMIN — PRAZOSIN HYDROCHLORIDE SCH MG: 1 CAPSULE ORAL at 21:02

## 2021-07-11 RX ADMIN — SODIUM BICARBONATE TAB 325 MG SCH MG: 325 TAB at 17:02

## 2021-07-11 RX ADMIN — Medication SCH EA: at 09:00

## 2021-07-11 RX ADMIN — Medication SCH: at 09:00

## 2021-07-11 RX ADMIN — ALBUTEROL SULFATE SCH: 2.5 SOLUTION RESPIRATORY (INHALATION) at 08:00

## 2021-07-11 RX ADMIN — ALBUTEROL SULFATE SCH: 2.5 SOLUTION RESPIRATORY (INHALATION) at 20:00

## 2021-07-11 RX ADMIN — METOPROLOL SUCCINATE SCH MG: 50 TABLET, EXTENDED RELEASE ORAL at 08:57

## 2021-07-11 RX ADMIN — HEPARIN SODIUM AND DEXTROSE PRN MLS: 5000; 5 INJECTION INTRAVENOUS at 08:54

## 2021-07-11 RX ADMIN — Medication SCH ML: at 21:03

## 2021-07-11 RX ADMIN — PANTOPRAZOLE SODIUM SCH MG: 40 TABLET, DELAYED RELEASE ORAL at 08:57

## 2021-07-11 NOTE — P.PN
Subjective


Date of Service: 07/11/21


Primary Care Provider: DR. Davies, Dr. alexander


Chief Complaint: COPD exacerbation, JAREK


Subjective: No new changes (feels about the same, breathing ok with some wheeze,

feels like some phlegm is at bottom of throat he can't cough up. urinating much 

more, much clearer urine, without difficulty)





Review of Systems


10-point ROS is otherwise unremarkable





Physical Examination





- Vital Signs


Temperature: 97.5 F


Blood Pressure: 108/56


Pulse: 102


Respirations: 18


Pulse Ox (%): 95





Assessment & Plan


Physician Review Additional Text: 


Physical Exam


General: Alert, In no apparent distress, Oriented x3


HEENT: normal conjunctiva, sclera anicteric


Respiratory: nonlabored on room air, mild b/l wheeze, faint crackles bilaterally


Cardiovascular: irregularly irregular rhythm, HR: , trace b/l pedal edema


Gastrointestinal: soft, nontender, nondistended


Musculoskeletal: No tenderness, no joint swelling


Integumentary: No rashes





Problem List


Dyspnea, expiratory wheezing secondary to acute on chronic COPD exacerbation vs 

bronchitis


metabolic alkalosis


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


elevated troponin


Multiple myeloma


Hypertension


Hyperlipidemia





-breathing improved since admission, continue PO steroids, nebs, inhaler, IS. on

RA


-JAREK on CKD4, improved with IVF and increased PO intake, nephrology consulted 

for further assistance


-CXR with slight increased congestion, exam consistent, will dc IVF, pt is 

getting some from heparin drip


-alkalosis likely secondary to renal disease, bicarb ordered, improving


-on active chemotherapy for multiple myeloma, risk of bleeding, will further 

discuss with cardiology, and will reach out to his oncologist


-continue home medications


-troponin decreased, no chest pain





VTE: heparin ggt


Code: full


Dispo: anticipate dc home in ~24-48hrs





Time Spent Managing Pts Care (In Minutes): 35

## 2021-07-11 NOTE — CON
Date of Consultation:  07/11/2021



Reason For Consultation:  New-onset atrial fibrillation.



History Of Present Illness:  Mr. Rudd is an 85-year-old Latin-American male who has a history of h
ypertension, COPD, and multiple myeloma, which is all fairly new onset, came into the emergency room 
with a creatinine at 3.73, hemoglobin of 9.5, D-dimer of 5858.  He was found to be in atrial fibrilla
tion at a rate of 104.  He was hypocalcemic with a calcium of 6.6, mildly elevated troponin and BNP o
f 4411.  The patient denied any chest pain.  He denied any palpitation.  Has had shortness of breath 
and wheezing.  Denied PND, orthopnea, pedal edema, or syncope.  Denied any fever or chills.



Past Medical History:  As stated above.



Allergies:  NONE.



Review of Systems:

Negative.



Social History:  Negative.



Family History:  Negative.



Medications:  At home include hydrochlorothiazide, losartan, Revlimid for his multiple myeloma.



Physical Examination:

General:  He was a pleasant, no acute distress. 

Vital Signs:  Atrial fibrillation, 104.  HEENT:  Negative. 

Neck:  Supple.  No bruit. 

Chest:  Clear. 

Cardiac:  Revealed atrial fibrillation. 

Abdomen:  Obese, but benign. 

Extremities:  Revealed no clubbing, cyanosis, or edema.



Diagnostic Data:  As stated above.



Impression And Plan:  

1.New onset atrial fibrillation.  The patient is on heparin.  Echocardiogram is pending for tomorrow
.  We should put him on a beta-blocker, I think he should be anticoagulated with Eliquis 2.5 mg b.i.d
.

2.Renal failure.  Echocardiogram is pending for the morning.  Dr. Davies was involved in his care.

3.Multiple myeloma.

4.Hypertension, well controlled.

5.Chronic obstructive pulmonary disease, well controlled.

6.Hypocalcemia that needs to be corrected.

7.Elevated troponin and BNP secondary to multiple myeloma, renal failure, and chronic obstructive pu
lmonary disease exacerbation.  This is demand ischemia.  Not a candidate for an acute coronary syndro
me.  For now continue present regimen.  Get an echo, beta-blockers, and Eliquis.  Stop losartan.  Sto
p aspirin.  Stop hydrochlorothiazide.  We will continue to follow.





NB/WENDIE

DD:  07/11/2021 12:38:26Voice ID:  [id number]

DT:  07/11/2021 13:34:27Report ID:  [id number]

## 2021-07-11 NOTE — RAD REPORT
EXAM DESCRIPTION:  Russ Single View7/11/2021 7:00 am

 

CLINICAL HISTORY:  Shortness of breath

 

COMPARISON:  July 9, 2021

 

FINDINGS:  Mild bilateral pulmonary opacities.

 

Heart is mildly enlarged.

 

IMPRESSION:   Mild CHF

## 2021-07-12 LAB
ALBUMIN SERPL BCP-MCNC: 3 G/DL (ref 3.4–5)
ALP SERPL-CCNC: 51 U/L (ref 45–117)
ALT SERPL W P-5'-P-CCNC: 23 U/L (ref 12–78)
AST SERPL W P-5'-P-CCNC: 12 U/L (ref 15–37)
BUN BLD-MCNC: 80 MG/DL (ref 7–18)
GLUCOSE SERPLBLD-MCNC: 146 MG/DL (ref 74–106)
HCT VFR BLD CALC: 30.4 % (ref 39.6–49)
LYMPHOCYTES # SPEC AUTO: 0.6 K/UL (ref 0.7–4.9)
MAGNESIUM SERPL-MCNC: 2.3 MG/DL (ref 1.8–2.4)
PMV BLD: 12.7 FL (ref 7.6–11.3)
POTASSIUM SERPL-SCNC: 4.2 MMOL/L (ref 3.5–5.1)
RBC # BLD: 3.25 M/UL (ref 4.33–5.43)

## 2021-07-12 RX ADMIN — SODIUM BICARBONATE TAB 325 MG SCH MG: 325 TAB at 17:20

## 2021-07-12 RX ADMIN — IPRATROPIUM BROMIDE SCH MG: 0.5 SOLUTION RESPIRATORY (INHALATION) at 08:08

## 2021-07-12 RX ADMIN — Medication SCH ML: at 20:51

## 2021-07-12 RX ADMIN — PRAZOSIN HYDROCHLORIDE SCH MG: 1 CAPSULE ORAL at 20:48

## 2021-07-12 RX ADMIN — APIXABAN SCH MG: 2.5 TABLET, FILM COATED ORAL at 20:50

## 2021-07-12 RX ADMIN — SODIUM BICARBONATE TAB 325 MG SCH MG: 325 TAB at 12:50

## 2021-07-12 RX ADMIN — IPRATROPIUM BROMIDE SCH MG: 0.5 SOLUTION RESPIRATORY (INHALATION) at 20:40

## 2021-07-12 RX ADMIN — APIXABAN SCH MG: 2.5 TABLET, FILM COATED ORAL at 09:21

## 2021-07-12 RX ADMIN — METOPROLOL SUCCINATE SCH MG: 50 TABLET, EXTENDED RELEASE ORAL at 09:20

## 2021-07-12 RX ADMIN — SODIUM BICARBONATE TAB 325 MG SCH MG: 325 TAB at 20:47

## 2021-07-12 RX ADMIN — Medication SCH EA: at 09:18

## 2021-07-12 RX ADMIN — Medication SCH: at 09:00

## 2021-07-12 RX ADMIN — ALBUTEROL SULFATE SCH MG: 2.5 SOLUTION RESPIRATORY (INHALATION) at 08:08

## 2021-07-12 RX ADMIN — Medication SCH ML: at 09:00

## 2021-07-12 RX ADMIN — ALBUTEROL SULFATE SCH: 2.5 SOLUTION RESPIRATORY (INHALATION) at 02:00

## 2021-07-12 RX ADMIN — SODIUM BICARBONATE TAB 325 MG SCH MG: 325 TAB at 09:21

## 2021-07-12 RX ADMIN — Medication SCH EA: at 20:50

## 2021-07-12 RX ADMIN — PANTOPRAZOLE SODIUM SCH MG: 40 TABLET, DELAYED RELEASE ORAL at 09:20

## 2021-07-12 RX ADMIN — IPRATROPIUM BROMIDE SCH MG: 0.5 SOLUTION RESPIRATORY (INHALATION) at 15:36

## 2021-07-12 RX ADMIN — IPRATROPIUM BROMIDE SCH MG: 0.5 SOLUTION RESPIRATORY (INHALATION) at 01:35

## 2021-07-12 NOTE — PN
Date of Progress Note:  07/12/2021



The patient is seen in room 204 at [facility] on 2nd floor.



Subjective:  The patient is alert, awake and comfortable.  His daughter who is an ICU nurse is also i
n the room.  His wife is in the room.  I had a long discussion with the family, evaluated the patient
.  The patient is denying any shortness of breath currently.  His blood pressures are reasonably stab
le in the low 100 range.  He has some swelling in the lower extremities at about positive 1.  He is a
bout to get his echocardiogram done.  Has been evaluated by Cardiology.  His heart rate is irregularl
y irregular.  He currently seems to be in atrial fibrillation, but his heart rate is well controlled 
at about a 70 rate.



Objective:  Vital signs:  Show blood pressure 112/58, last before that was 130/67.  Respirations arou
nd 14 and comfortable.  Pulse is about 70 to 80 and irregularly irregular.  He is afebrile.  He does 
not seem to be in any pain, O2 sats are in mid 90s with no oxygen. 

Lungs:  Clear to auscultation anteriorly and bilaterally.  Abdomen:  Sof. 

Extremities:  Reveal positive 1 edema bilaterally.



Laboratory Data:  Reviewed.  Labs show WBC count of 8.8, hemoglobin 10, hematocrit 30.4, platelet cou
nt of 118.  Chemistry shows sodium 141, potassium 4.2, chloride 109, bicarb is 21, BUN is 80, and 2.8
2.  This is an improvement from 88 and 3.45 two days ago and 89/3.18 yesterday.  His glucose is 146, 
calcium is stable at 6.6, corrected calcium is slightly elevated from that number at about 7 range, b
ut still low from normal given his albumin of 3.0.  PTH is about 421.



Assessment And Plan:  

1.The patient is alert, awake, comfortable.  History of chronic obstructive pulmonary disease, histo
ry of multiple myeloma, history of congestive heart failure.  At this point, his volume status seems 
close to euvolemic.  May need Lasix if volume status changes, but he has been making good urine.  Now
, he has been about -1.5 L overnight.  His breathing is stable.  Continue to monitor.

2.Hypertension.  At this point, blood pressure is good.  Continue to monitor with encouraging p.o. i
ntake.

3.Atrial fibrillation.  This seems to be of new onset.  Dr. Sherwood is already evaluating.  The shani
ent is getting an echocardiogram.  May be placed on low-dose Eliquis and then cardioversion may be co
nsidered.  Obviously, we will defer treatment of this to Dr. Sherwood/Cardiology.

4.Acute kidney injury.  At this point, improved.  The patient's volume status is improving.  He is e
ating and drinking better.  Urine output has improved.  Continue to monitor with encouraging p.o. int
paige.  May need Lasix if develops any shortness of breath.  At this point, the patient is doing well a
nd breathing is stable.  Lung sounds clear.

5.Hypocalcemia.  The patient has been encouraged to take some p.o. calcium through his meals, dairy 
products.  He has discussed this with Dr. Nava as well.  He is on vitamin D replacement 2000 units pe
r day.  We will give him 50,000 units 1 time dose of ergocalciferol.  Also, we will repeat a calcium 
gluconate 1 g currently to help keep calcium stable and further treatment outpatient as indicated.





MD/WENDIE

DD:  07/12/2021 11:26:31Voice ID:  [id number]

DT:  07/12/2021 14:02:56Report ID:  [id number]

## 2021-07-12 NOTE — EKG
Test Date:    2021-07-09               Test Time:    20:30:10

Technician:                                          

                                                     

MEASUREMENT RESULTS:                                       

Intervals:                                           

Rate:         69                                     

DC:                                                  

QRSD:         84                                     

QT:           452                                    

QTc:          484                                    

Axis:                                                

P:                                                   

DC:                                                  

QRS:          14                                     

T:            31                                     

                                                     

INTERPRETIVE STATEMENTS:                                       

                                                     

Atrial fibrillation with a competing junctional pacemaker

Low voltage QRS

Prolonged QT

Abnormal ECG

Compared to ECG 07/11/2013 15:20:19

Low QRS voltage now present

Prolonged QT interval now present

Sinus rhythm no longer present



Electronically Signed On 07-12-21 16:05:32 CDT by Toni Sherwood

## 2021-07-12 NOTE — EKG
Test Date:    2021-07-10               Test Time:    12:03:24

Technician:   SRIDHAR PANDYA                                   

                                                     

MEASUREMENT RESULTS:                                       

Intervals:                                           

Rate:         111                                    

DC:                                                  

QRSD:         78                                     

QT:           368                                    

QTc:          500                                    

Axis:                                                

P:                                                   

DC:                                                  

QRS:          15                                     

T:            133                                    

                                                     

INTERPRETIVE STATEMENTS:                                       

                                                     

Atrial fibrillation with rapid ventricular response

Low voltage QRS

Nonspecific ST and T wave abnormality, probably digitalis effect

Abnormal ECG

Compared to ECG 07/09/2021 20:30:10

ST (T wave) deviation now present

Prolonged QT interval no longer present



Electronically Signed On 07-12-21 16:05:17 CDT by Toni Sherwood

## 2021-07-12 NOTE — RAD REPORT
EXAM DESCRIPTION:  RAD - Chest Single View - 7/12/2021 9:14 am

 

CLINICAL HISTORY:  f/u edema, dyspnea

Chest pain.

 

COMPARISON:  Chest Single View dated 7/11/2021; Chest Single View dated 7/9/2021; CHEST PA AND LAT 2 
VIEW dated 2/7/2013; CHEST PA AND LAT 2 VIEW dated 12/9/2010

 

FINDINGS:  Portable technique limits examination quality.

 

The lungs are grossly clear. The heart is upper limit of normal in size. No displaced fractures.

 

IMPRESSION:  No acute intrathoracic process suspected.

## 2021-07-12 NOTE — P.PN
Subjective


Date of Service: 07/12/21


Primary Care Provider: DR. Davies, Dr. alexander


Chief Complaint: COPD exacerbation, JAREK


Subjective: Improving (breathing comfortably on RA with wheeze, urine output has

picked up, has been drinking plenty of water, appetite improved, overall feeling

better. no new complaints.)





Review of Systems


10-point ROS is otherwise unremarkable





Physical Examination





- Vital Signs


Temperature: 97.6 F


Blood Pressure: 112/58


Pulse: 73


Respirations: 16


Pulse Ox (%): 94





Assessment & Plan


Physician Review Additional Text: 


Physical Exam


General: Alert, In no apparent distress, Oriented x3


HEENT: normal conjunctiva, sclera anicteric


Respiratory: nonlabored on room air, mild b/l wheeze with faint crackles 

bilaterally


Cardiovascular: irregularly irregular rhythm, HR: 90, trace b/l pedal edema


Gastrointestinal: soft, nontender, nondistended


Musculoskeletal: No tenderness, no joint swelling


Integumentary: No rashes





Problem List


Dyspnea, expiratory wheezing secondary to acute on chronic COPD exacerbation vs 

bronchitis


possible CHF


metabolic acidosis, resolved


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


NSTEMI secondary to demand ischemia


Multiple myeloma on chemotherapy


Hypertension


Hyperlipidemia





-breathing improved since admission, continue PO steroids, nebs, inhaler, IS. on

RA


-JAREK on CKD4, improved with IVF and increased PO intake, nephrology consulted 

for further assistance


-CXR with slight increased congestion on 7/11, exam consistent, with >3L UOP 

since yesterday, without diuretic usage


-acidosis likely secondary to renal disease, improved with PO bicarb and renal 

function improved


-on active chemotherapy for multiple myeloma, with risk of bleeding, discussed 

with his oncologist, Dr. Alexander, in agreement with Eliquis, renally dosed


-troponin decreased, no chest pain, cardiology consulted - suspect demand 

ischemia


-echo ordered for today, eval LA, function


-pt may have component of CHF, undiagnosed





VTE: Eliquis


Code: full


Dispo: anticipate dc home in ~24hrs





Time Spent Managing Pts Care (In Minutes): 35

## 2021-07-13 VITALS — SYSTOLIC BLOOD PRESSURE: 120 MMHG | DIASTOLIC BLOOD PRESSURE: 73 MMHG

## 2021-07-13 VITALS — OXYGEN SATURATION: 93 %

## 2021-07-13 VITALS — TEMPERATURE: 98.4 F

## 2021-07-13 LAB
ALBUMIN SERPL BCP-MCNC: 2.9 G/DL (ref 3.4–5)
BUN BLD-MCNC: 69 MG/DL (ref 7–18)
GLUCOSE SERPLBLD-MCNC: 117 MG/DL (ref 74–106)
HCT VFR BLD CALC: 30.2 % (ref 39.6–49)
MAGNESIUM SERPL-MCNC: 2.2 MG/DL (ref 1.8–2.4)
PMV BLD: 12.5 FL (ref 7.6–11.3)
POTASSIUM SERPL-SCNC: 4.1 MMOL/L (ref 3.5–5.1)
RBC # BLD: 3.23 M/UL (ref 4.33–5.43)

## 2021-07-13 RX ADMIN — SODIUM BICARBONATE TAB 325 MG SCH MG: 325 TAB at 08:55

## 2021-07-13 RX ADMIN — Medication SCH EA: at 09:03

## 2021-07-13 RX ADMIN — IPRATROPIUM BROMIDE SCH MG: 0.5 SOLUTION RESPIRATORY (INHALATION) at 02:40

## 2021-07-13 RX ADMIN — Medication SCH ML: at 08:57

## 2021-07-13 RX ADMIN — APIXABAN SCH MG: 2.5 TABLET, FILM COATED ORAL at 08:56

## 2021-07-13 RX ADMIN — PANTOPRAZOLE SODIUM SCH MG: 40 TABLET, DELAYED RELEASE ORAL at 08:56

## 2021-07-13 RX ADMIN — Medication SCH: at 08:56

## 2021-07-13 RX ADMIN — Medication SCH EA: at 08:55

## 2021-07-13 RX ADMIN — IPRATROPIUM BROMIDE SCH MG: 0.5 SOLUTION RESPIRATORY (INHALATION) at 08:30

## 2021-07-13 RX ADMIN — Medication SCH EA: at 08:56

## 2021-07-13 NOTE — PN
The patient is seen in room 204 at [facility].



Subjective:  He is alert, awake and comfortable, speaks mainly Bulgarian, but able to communicate comfo
rtably.  His daughter is in the room with him.  His wife is in the room with him.  The patient has be
en improving in terms of his shortness of breath.  Still has some swelling in the lower extremities, 
but this seems to be stable/somewhat improved compared to yesterday.  He denies any new chest pain or
 shortness of breath.  His blood pressures are slightly on the higher side, but not too bad, between 
120 to 140 range.



Objective:  Vital signs:  Last blood pressure is 144/66. 

Lungs:  Clear to auscultation. 

Abdomen:  Soft. 

Extremities:  Reveal trace edema bilaterally.



Laboratory Data:  Lab data reviewed.  His WBC 6.5, hemoglobin hematocrit are 10.3/30.2, platelet coun
t of 115.  Sodium 144, potassium 4.1, chloride 113, bicarb is 25, BUN, creatinine 69 and 2.59, calciu
m 7, phosphorus 3.4, magnesium 2.2.  Albumin 2.9.



Assessment And Plan:  The patient with congestive heart failure; history of chronic obstructive pulmo
nary disease; history of hypertension, now with atrial fibrillation; acute kidney injury on chronic k
idney disease; history of multiple myeloma; hypocalcemia.  At this point, calcium is reasonably stabl
e.  Has had repletion.  He is following with Dr. Nava for his treatment of multiple myeloma.  Blood p
ressure seems to be reasonably controlled.  He does have some slight volume overload, but that has si
gnificantly improved during the hospital stay.  We will start him on Lasix 40 mg p.o. daily.  Follow 
up with Dr. Davies for further medication adjustment.  If cleared by Dr. Sherwood may be able to get 
discharged today.  Will likely need evaluation and treatment for his atrial fibrillation, possibility
 of 

cardioversion, but obviously we will defer that to Dr. Sherwood/Cardiology.





MD/WENDIE

DD:  07/13/2021 12:00:46Voice ID:  [id number]

DT:  07/13/2021 15:22:38Report ID:  [id number]

## 2021-07-13 NOTE — P.DS
Admission Date: 07/09/21


Discharge Date: 07/13/21


Primary Care Provider: DR. Davies, Dr. alexander


Disposition: ROUTINE DISCHARGE


Discharge Condition: FAIR


Reason for Admission: COPD exacerbation, JAREK


Brief History of Present Illness: 


85-year-old man with a history of multiple myeloma, CKD 4, COPD, anemia chronic 

disease, hypertension presents emergency department for shortness of breath.  

Patient evaluated in the emergency department found to be having expiratory 

wheezing, dyspnea, tachypnea.  He was diagnosed with COPD exacerbation and given

steroids and nebulizer treatments.  Labs significant for hemoglobin 10.8 

hematocrit 31.6 D-dimer 5858 calcium 6.8, albumin 3.4, chloride 108, CO2 20, BUN

88 creatinine 3.41 GFR 17. Patient admitted for further management.





Hospital Course: 


Dyspnea, expiratory wheezing secondary to acute on chronic COPD exacerbation


Acute diastolic CHF


metabolic acidosis, resolved


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


NSTEMI secondary to demand ischemia


Multiple myeloma on chemotherapy


Hypertension


Hyperlipidemia.








Patient admitted to the medical floor and treated with IV steroid, nebs and 

inhalers, he did not require oxygen.


-noted to have JAREK on CKD4 which improved with IVF.  Nephrology consulted who 

assisted with management


-patient has acidosis likely secondary to renal disease, improved with PO bicarb

and improvement in renal function.


-patient noted to be in rapid atrial fibrillation.  Patient seen by cardiology-

Dr. Sherwood. He was on Toprol-XL 25 mg daily which was increased to 100 mg.


-on active chemotherapy for multiple myeloma.  Patient placed on renal dosed 

Eliquis.


-troponin mildly elevated but trended down.  Seen by cardiology and elevated 

troponin deemed to be secondary to demand ischemia


-echo showed normal EF.


-patient clinically improved and deemed stable for discharge.





Vital Signs/Physical Exam: 














Temp Pulse Resp BP Pulse Ox


 


 98.4 F   95 H  18   144/66 H  94 


 


 07/13/21 08:00  07/13/21 08:56  07/13/21 08:00  07/13/21 08:56  07/13/21 08:00








General: Alert, In no apparent distress, Oriented x3


HEENT: Mucous membr. moist/pink


Neck: JVD not distended


Respiratory: Clear to auscultation bilaterally, Normal air movement


Cardiovascular: No edema, Regular rate/rhythm, Normal S1 S2


Gastrointestinal: Soft and benign, Non-distended, No tenderness


Musculoskeletal: No tenderness


Integumentary: No rashes, No erythema


Neurological: Normal strength at 5/5 x4 extr


Laboratory Data at Discharge: 














WBC  6.50 K/uL (4.3-10.9)  D 07/13/21  05:35    


 


Hgb  10.3 g/dL (13.6-17.9)  L  07/13/21  05:35    


 


Hct  30.2 % (39.6-49.0)  L  07/13/21  05:35    


 


Plt Count  115 K/uL (152-406)  L  07/13/21  05:35    


 


PT  12.8 SECONDS (9.5-12.5)  H  07/10/21  14:11    


 


INR  1.11   07/10/21  14:11    


 


APTT  99.6 SECONDS (24.3-36.9)  H  07/11/21  17:25    


 


Sodium  144 mmol/L (136-145)   07/13/21  05:35    


 


Potassium  4.1 mmol/L (3.5-5.1)   07/13/21  05:35    


 


BUN  69 mg/dL (7-18)  H  07/13/21  05:35    


 


Creatinine  2.59 mg/dL (0.55-1.3)  H  07/13/21  05:35    


 


Glucose  117 mg/dL ()  H  07/13/21  05:35    


 


Uric Acid  6.9 mg/dL (3.5-7.2)   07/10/21  05:20    


 


Phosphorus  3.4 mg/dL (2.5-4.9)   07/13/21  05:35    


 


Magnesium  2.2 mg/dL (1.8-2.4)   07/13/21  05:35    


 


Total Bilirubin  0.3 mg/dL (0.2-1.0)   07/12/21  03:38    


 


AST  12 U/L (15-37)  L  07/12/21  03:38    


 


ALT  23 U/L (12-78)   07/12/21  03:38    


 


Alkaline Phosphatase  51 U/L ()   07/12/21  03:38    


 


Troponin I  0.04 ng/mL (0.0-0.045)   07/10/21  11:30    


 


Triglycerides  102 mg/dL (<150)   07/10/21  05:20    


 


Cholesterol  118 mg/dL (<200)   07/10/21  05:20    


 


HDL Cholesterol  40 mg/dL (40-60)   07/10/21  05:20    


 


Cholesterol/HDL Ratio  2.95   07/10/21  05:20    








Home Medications: 








Allopurinol 1 tab PO DAILY 07/10/21 


Budesonide/Formoterol Fumarate [Budesonide-Formoterol 160-4.5] 1 puff IH BID 

07/10/21 


Lenalidomide [Revlimid] 1 cap PO DAILY 07/10/21 


Omeprazole 1 cap PO DAILY 07/10/21 


Prazosin HCl 1 cap PO BEDTIME 07/10/21 


Valacyclovir [Valtrex*] 1 tab PO DAILY 07/10/21 


dexAMETHasone [Dexamethasone] 5 tab PO SEECOM 07/10/21 


Apixaban [Eliquis *] 2.5 mg PO BID #60 tablet 07/13/21 


Furosemide [Lasix] 40 mg PO DAILY #30 tab 07/13/21 


Metoprolol Succinate [Toprol Xl*] 100 mg PO DAILY #30 tab 07/13/21 


Na Bicarb Tab [Sodium Bicarb 325 MG Tab*] 650 mg PO QID #120 tab 07/13/21 





New Medications: 


Apixaban [Eliquis *] 2.5 mg PO BID #60 tablet


Furosemide [Lasix] 40 mg PO DAILY #30 tab


Na Bicarb Tab [Sodium Bicarb 325 MG Tab*] 650 mg PO QID #120 tab


Metoprolol Succinate [Toprol Xl*] 100 mg PO DAILY #30 tab


Diet: AHA


Activity: Ad radha


Followup: 


Suhail Davies DO [ACTIVE - CAN ADMIT] - 1 Week (call to schedule appointment)


Toni Sherwood MD [ACTIVE - CAN ADMIT] - 1-2 Weeks (Call to schedule 

appointment)


Leonela Woods MD [Primary Care Provider] - 1 Week (Call to schedule 

appointment)


Time spent managing pt's care (in minutes): 37

## 2021-07-13 NOTE — P.CNS
Date of Consult: 07/10/21


Reason for Consult: JAREK/ CKD


Requesting Physician: Talon Agustin


Primary Care Provider: DR. Davies, Dr. alexander


Chief Complaint: COPD exacerbation, JAREK


History of Present Illness: 





85-year-old  male with history of multiple myeloma, CKD 4, COPD, anemia 

chronic disease, hypertension presents emergency department for shortness of 

breath.  Patient evaluated in the emergency department found to be having 

expiratory wheezing, dyspnea, tachypnea.  Patient given steroids/nebulizer 

treatments.





19:39 This 85 yrs old  Male presents to ER via Ambulatory with 

complaints of Wheezing jmm 


      > 1 Year.                                                                 

                 


19:39 The patient presents to the emergency department with wheezing, Current 

therapy:        jmm 


      steroid inhaler. Onset: The symptoms/episode began/occurred gradually, 

today. Modifying     


      factors: The symptoms are alleviated by nothing, the symptoms are 

aggravated by             


      nothing. Associated signs and symptoms: Pertinent negatives: fever. Denies

chest pain.      


Allergies





No Known Allergies Allergy (Verified 07/10/21 00:23)


   





Home medications list reviewed: Yes


Home Medications: 








Allopurinol 1 tab PO DAILY 07/10/21 


Budesonide/Formoterol Fumarate [Budesonide-Formoterol 160-4.5] 1 puff IH BID 

07/10/21 


Lenalidomide [Revlimid] 1 cap PO DAILY 07/10/21 


Omeprazole 1 cap PO DAILY 07/10/21 


Prazosin HCl 1 cap PO BEDTIME 07/10/21 


Valacyclovir [Valtrex*] 1 tab PO DAILY 07/10/21 


dexAMETHasone [Dexamethasone] 5 tab PO SEECOM 07/10/21 


Apixaban [Eliquis *] 2.5 mg PO BID #60 tablet 07/13/21 


Furosemide [Lasix] 40 mg PO DAILY #30 tab 07/13/21 


Metoprolol Succinate [Toprol Xl*] 100 mg PO DAILY #30 tab 07/13/21 


Na Bicarb Tab [Sodium Bicarb 325 MG Tab*] 650 mg PO QID #120 tab 07/13/21 








- Past Medical/Surgical History


-: Multiple myeloma


-: CKD 4


-: Hypertension


-: COPD


-: Hyperlipidemia


-: Anemia of chronic disease


-: Prostatectomy


-: Cholecystectomy


Psychosocial/ Personal History: Retired, lives with wife





- Family History


  ** Father


Medical History: Heart disease





  ** Sister


Medical History: Diabetes





- Social History


Alcohol use: No


CD- Drugs: No


Caffeine use: Yes


Place of Residence: Home





Review of Systems


10-point ROS is otherwise unremarkable


General: Weakness, Malaise


Respiratory: SOB with Excertion





Physical Examination














Temp Pulse Resp BP Pulse Ox


 


 98.4 F   85   16   120/73   94 


 


 07/13/21 11:56  07/13/21 11:56  07/13/21 11:56  07/13/21 11:56  07/13/21 11:56








General: In no apparent distress, Oriented x3, Cooperative


HEENT: Atraumatic


Neck: Supple


Respiratory: Clear to auscultation bilaterally


Cardiovascular: Regular rate/rhythm, Edema


Gastrointestinal: Soft and benign, Non-distended


Musculoskeletal: No clubbing, No contractures


Integumentary: No rashes, No cyanosis


Neurological: Normal speech


Blood work reviewed in the chart.


Imagings Data: 


EXAM DESCRIPTION:  Russ Single View7/9/2021 8:26 pm


CLINICAL HISTORY:  Wheezing


COMPARISON:  2014


FINDINGS:   The lungs appear clear of acute infiltrate. The heart is borderline 

enlarged


A moderate hiatal hernia


IMPRESSION:   No acute abnormalities displayed








EXAM DESCRIPTION:  US - Renal Ultrasound-Complete - 7/10/2021 7:36 am


CLINICAL HISTORY:  jarek/ckd


COMPARISON:  CT HEAD SPINE CAP W CONTRAST dated 9/20/2014


FINDINGS:  The right kidney measures approximately 9.5 x 5.0 cm.  The left 

kidney measures approximately 10.0 x 5.0 centimeter. Renal cortical thickness 

and echogenicity are normal. No hydronephrosis or suspicious renal mass. A 4 

centimeter diameter thin-walled anechoic cyst is present anterior lower pole 

right kidney. No suspicious characteristics. This measures smaller in diameter t

horn on the 2014 study. Upper pole left kidney shows an 18 millimeter cyst with a

14 millimeter cyst in the mid to lower pole.


Bladder was contracted and could not be visualized adequately for assessment.


IMPRESSION:  No hydronephrosis or suspicious renal mass.


Bilateral renal cysts are present with no suspicious characteristics.








ATRIAL FIBRILLATION.  NO THROMBUS.  LEFT ATRIAL ENLARGEMENT.  


                            MILD TRICUSPID REGURGITATION.  MILD PULMONARY 

HYPERTENSION.  


                            NORMAL LEFT VENTRICULAR SIZE AND FUNCTION.


Conclusions/Impression: 





JAREK likely due to hypovolemia


CKD IV with proteinuria


-No NSAIDs


-Gentle IVF





Acidosis


-Start oral bicarb





Hypocalcemia


-Start Vitamin D3





HTN with CKD/ CHF


-Continue Metoprolol





Diastolic CHF, chronic


-Low sodium diet


-Continue Metoprolol





Anemia in chronic illness


-Retacrit PRN





Multiple Myeloma


-Chemotx per oncology





Case reviewed with Dr. Agustin


Thank you kindly for the consultation.


Late entry due to computer access problems.

## 2021-07-13 NOTE — PN
Date of Progress Note:  07/12/2021



Subjective:  Mr. Rudd has a history of multiple myeloma, fairly new onset atrial fibrillation with
out any symptoms, on beta blockers 50 daily, remains with a heart rate of 110 with atrial fibrillatio
n.  Echocardiogram showed an ejection fraction about 45%.  He still has some symptoms consistent with
 mild CHF, I think he needs to be diuresed.  I think he needs to play be placed on Eliquis.  No aspir
in, no losartan, no hydrochlorothiazide.  Follow up on his renal function, increase metoprolol to b.i
.d.  He can go home whenever it is okay with Dr. Agustin.  I will see him in the office soon and I will
 plan to do a cardioversion in about 3 weeks from the time we started Eliquis if he continues to be i
n atrial fibrillation.  When he goes home, he should be on a low dose Lasix.





NB/WENDIE

DD:  07/13/2021 19:47:40Voice ID:  [id number]

DT:  07/13/2021 21:49:31Report ID:  [id number]

## 2021-07-13 NOTE — CON
Consulting Physician:  Dr. Talon Agustin.



Reason For Consultation:  Multiple myeloma.



History Of Present Illness:  Mr. Rudd is an 85-year-old gentleman, known to 
me from the Oncology Clinic where he was recently diagnosed with active multiple
myeloma and started on chemotherapy with Velcade/Revlimid/dexamethasone (VRD) on
07/06/2021.  



He was hospitalized on 07/09/2021 when he presented to the emergency room with a
2-day history of worsening shortness of breath, cough and wheezing.  They report
that he developed the symptoms the day after his chemotherapy and the cough and 
wheezing continued to worsen over the next 24 hours.  They deny any history of 
fever or phlegm.  The daughter notes that he had gained nearly 15 pounds in the 
2 to 3 days between the initial chemotherapy and the ER visit.  While in the 
emergency room, he was noted to have extensive expiratory wheezing with 
tachypnea.  He was hospitalized for management of fluid overload, possible 
congestive heart failure and dyspnea.  ER evaluation also revealed a worsening 
of his kidney function, he was noted to be in acute kidney failure. 



He denies any history of headache, dizziness, or diplopia.  He denies any change
in his urine, dysuria or painful micturition.  He denies any history of 
diarrhea, abdominal pain, nausea, or vomiting.



Review of Systems:

Otherwise as noted above.



Past Medical History:  Significant for,

1.   COPD.

2.   Hypertension.

3.   Prostate cancer.

4.   Stage 4 chronic kidney disease.

5.   GERD.

6.   Hyperlipidemia.

7.   Obesity.



Past Surgical History:  Significant for a lap cholecystectomy and radical 
prostatectomy.



Current Medications:  Are as follows; allopurinol 100 mg p.o. daily, albuterol 
nebulizer treatments q.6 hours p.r.n., Eliquis 2.5 mg p.o. b.i.d., dexamethasone
20 mg p.o. q.week on day of chemotherapy, Lasix 40 mg p.o. daily, Atrovent 
nebulizer q.6 hours p.r.n., metoprolol 100 mg p.o. daily, pantoprazole 40 mg 
p.o. daily, Prazosin 2 mg p.o. at bedtime.



Allergies:  HE HAS NO KNOWN DRUG ALLERGIES.



Social And Family History:  Unchanged from clinic and prior.



Physical Examination:

General:  Mr. Rudd was resting comfortably with his daughter at his bedside. 


Vital Signs:  Temperature was 97.1 Fahrenheit at noon, pulse 74, respirations 
18, blood pressure 118/56, saturating at 95% on room air.  I's and O's revealed 
an intake of 1100 in and 3000 mL out over the prior 24 hours.  

General Physical Examination:  Revealed pallor.  No cyanosis or icterus was 
noted.  

HEENT:  Examination was unremarkable.  There was no palpable lymphadenopathy in 
the neck, axilla, or groin.  

Chest:  Clear to auscultation with scattered bibasilar rales posteriorly.  

Heart:  Sounds reveal normal S1, S2.  No gallops or murmurs.  

Abdomen:  Obese.  Liver and spleen were not palpable.  No free fluid was 
appreciated. 

Extremities:  Showed 1+ bipedal edema.  Neurologic:  He was alert, oriented with
no evidence of acute deficits.



Laboratory Data:  Lab data revealed a normal white count, hemoglobin was 10, 
hematocrit 30.4, platelet count of 118,000.  Chemistries revealed improving 
renal function.  BUN was 80, creatinine 2.82, estimated GFR of 22, which is 
close to his baseline.



Assessment And Plan:  

1.   Multiple myeloma.  



He received his first cycle of VRD on 07/06/2021, the fluid overload could be as
a result of dexamethasone freshly given his chronic kidney disease.  We will 
decrease his dose of dexamethasone to 16 mg.  

I have also suggested that he hold the Revlimid 10 mg tablets until I see him in
clinic and reassess his renal function.  



We will resume chemotherapy next week if he clinically is back to baseline and 
his kidney function has also improved back to baseline.  He will continue on 
allopurinol 100 mg daily.



2.   Fluid overload.  



Cardiology and Nephrology has been consulted.  Echo revealed an EF of 78% with 
no evidence of thrombosis.  Left atrial enlargement was noted.  He will continue
to follow up with Cardiology as advised.



3.   Chronic kidney disease stage 4.  



This is managed by Dr. Davies.  He was encouraged to use Lasix as advised.



4.   Atrial fibrillation.  



He was started on dose appropriate Eliquis 2.5 mg b.i.d. and will continue on 
that.



5.   Hypocalcemia.  



This could be as a result of Xgeva that he received on 07/06/2021.  We will 
continue to monitor his vitamin D and calcium levels as outpatient.  Calcium and
supplementation advised through meals and dairy.



I will see him in clinic this week and repeat labs, plan is to resume 
chemotherapy next week.





AP/MODL

DD:  07/13/2021 21:52:29   Voice ID:  [id number]

DT:  07/13/2021 22:29:03   Report ID:  [id number]

MTDD

## 2021-07-13 NOTE — ECHO
HEIGHT: 5 ft 8 in   WEIGHT: 229 lb 0 oz   DATE OF STUDY: 07/12/2021   REFER DR: Talon Agustin MD

2-DIMENSIONAL: YES

     M.MODE: YES

 DOPPLER: YES

COLOR FLOW: YES



                    TDS:  

PORTABLE: 

 DEFINITY:  

BUBBLE STUDY: 





DIAGNOSIS:  EVALUATE FUNCTION/ ATRIAL FIBRILLATION



CARDIAC HISTORY:  

CATHERIZATION: 

SURGERY: 

PROSTHETIC VALVE: 

PACEMAKER: 





MEASUREMENTS (cm)

    DIASTOLIC (NORMALS)                 SYSTOLIC (NORMALS)

IVSd                 1.0 (0.6-1.2)                    LA Diam 4.6 (1.9-4.0)     LVEF       
  78%  

LVIDd               4.9 (3.5-5.7)                        LVIDs      2.6 (2.0-3.5)     %FS  
        46%

LVPWd             1.0 (0.6-1.2)

Ao Diam           2.5 (2.0-3.7)



2 DIMENSIONAL ASSESSMENT:

RIGHT ATRIUM:                   NORMAL

LEFT ATRIUM:       NORMAL



RIGHT VENTRICLE:            NORMAL

LEFT VENTRICLE: NORMAL



TRICUSPID VALVE:             NORMAL

MITRAL VALVE:     NORMAL



PULMONIC VALVE:             NORMAL

AORTIC VALVE:     NORMAL



PERICARDIAL EFFUSION: NONE

AORTIC ROOT:      NORMAL





LEFT VENTRICULAR WALL MOTION:     NORMAL



DOPPLER/COLOR FLOW:     MILD TRICUSPID REGURGITATION



COMMENTS:      ATRIAL FIBRILLATION.  NO THROMBUS.  LEFT ATRIAL ENLARGEMENT.  

                            MILD TRICUSPID REGURGITATION.  MILD PULMONARY HYPERTENSION.  

                            NORMAL LEFT VENTRICULAR SIZE AND FUNCTION.



TECHNOLOGIST:   AMY CUETO

## 2021-07-15 LAB
1,25(OH)2D SERPL-MCNC: 26 PG/ML (ref 18–72)
1,25(OH)2D2 SERPL-MCNC: <8 PG/ML

## 2021-07-28 LAB — INR BLD: 1.58

## 2021-07-29 ENCOUNTER — HOSPITAL ENCOUNTER (OUTPATIENT)
Dept: HOSPITAL 97 - CCL | Age: 86
Discharge: HOME | End: 2021-07-29
Attending: INTERNAL MEDICINE
Payer: COMMERCIAL

## 2021-07-29 VITALS — OXYGEN SATURATION: 96 %

## 2021-07-29 VITALS — DIASTOLIC BLOOD PRESSURE: 59 MMHG | SYSTOLIC BLOOD PRESSURE: 118 MMHG

## 2021-07-29 VITALS — TEMPERATURE: 96.8 F

## 2021-07-29 DIAGNOSIS — Z20.822: ICD-10-CM

## 2021-07-29 DIAGNOSIS — Z85.79: ICD-10-CM

## 2021-07-29 DIAGNOSIS — I48.91: Primary | ICD-10-CM

## 2021-07-29 DIAGNOSIS — Z79.01: ICD-10-CM

## 2021-07-29 DIAGNOSIS — I50.21: ICD-10-CM

## 2021-07-29 LAB
BUN BLD-MCNC: 34 MG/DL (ref 7–18)
GLUCOSE SERPLBLD-MCNC: 87 MG/DL (ref 74–106)
POTASSIUM SERPL-SCNC: 3.5 MMOL/L (ref 3.5–5.1)

## 2021-07-29 PROCEDURE — 85730 THROMBOPLASTIN TIME PARTIAL: CPT

## 2021-07-29 PROCEDURE — 80048 BASIC METABOLIC PNL TOTAL CA: CPT

## 2021-07-29 PROCEDURE — 36415 COLL VENOUS BLD VENIPUNCTURE: CPT

## 2021-07-29 PROCEDURE — 92960 CARDIOVERSION ELECTRIC EXT: CPT

## 2021-07-29 PROCEDURE — 85610 PROTHROMBIN TIME: CPT

## 2021-07-29 NOTE — OP
Date of Procedure:  07/29/2021



Surgeon:  Toni Sherwood MD



Assistant:  Zoraida Yung and Shannen Peres.



The patient brought to the cath lab on 07/29/2021 at 8 in the morning for cardioversion.



Indication:  Atrial fibrillation.



Procedure In Detail:  Mr. Rudd is 85, has a history of multiple myeloma this is new onset, history
 of congestive heart failure that is acute systolic that is new onset, was found to be in AFib, unkno
wn duration, has been on beta-blockers and Eliquis for approximately 3 weeks, brought to the cath lab
 today, received 8 mg of Versed IV push, received 2 shocks of 200 joules and failed to cardiovert him
.  He remained in atrial fibrillation at a rate of 60 to 70.  There were no complications or blood lo
ss.



Postoperative Diagnoses:  Atrial fibrillation, failed cardioversion, remained in atrial fibrillation.
  We will continue beta-blocker and Eliquis.  Consider amiodarone.  Consider ablation depending on hi
s symptoms.



Anesthesia:  Total conscious sedation of 30 minutes. 



The patient will go home when he wakes up.





NB/WENDIE

DD:  07/29/2021 08:14:42Voice ID:  [id number]

DT:  07/29/2021 09:32:54Report ID:  [id number]

## 2021-08-15 ENCOUNTER — HOSPITAL ENCOUNTER (EMERGENCY)
Dept: HOSPITAL 97 - ER | Age: 86
Discharge: HOME | End: 2021-08-15
Payer: COMMERCIAL

## 2021-08-15 VITALS — TEMPERATURE: 98.1 F

## 2021-08-15 VITALS — SYSTOLIC BLOOD PRESSURE: 110 MMHG | DIASTOLIC BLOOD PRESSURE: 72 MMHG | OXYGEN SATURATION: 98 %

## 2021-08-15 DIAGNOSIS — I10: ICD-10-CM

## 2021-08-15 DIAGNOSIS — A08.39: Primary | ICD-10-CM

## 2021-08-15 LAB
ALBUMIN SERPL BCP-MCNC: 3.1 G/DL (ref 3.4–5)
ALP SERPL-CCNC: 64 U/L (ref 45–117)
ALT SERPL W P-5'-P-CCNC: 19 U/L (ref 12–78)
AST SERPL W P-5'-P-CCNC: 10 U/L (ref 15–37)
BLD SMEAR INTERP: (no result)
BUN BLD-MCNC: 22 MG/DL (ref 7–18)
GLUCOSE SERPLBLD-MCNC: 86 MG/DL (ref 74–106)
HCT VFR BLD CALC: 34.2 % (ref 39.6–49)
INR BLD: 1.43
LYMPHOCYTES # SPEC AUTO: 0.7 K/UL (ref 0.7–4.9)
MAGNESIUM SERPL-MCNC: 1.8 MG/DL (ref 1.8–2.4)
MORPHOLOGY BLD-IMP: (no result)
NT-PROBNP SERPL-MCNC: 6435 PG/ML (ref ?–450)
PMV BLD: 12.1 FL (ref 7.6–11.3)
POTASSIUM SERPL-SCNC: 3.7 MMOL/L (ref 3.5–5.1)
RBC # BLD: 3.49 M/UL (ref 4.33–5.43)
TROPONIN (EMERG DEPT USE ONLY): < 0.02 NG/ML (ref 0–0.04)

## 2021-08-15 PROCEDURE — 80048 BASIC METABOLIC PNL TOTAL CA: CPT

## 2021-08-15 PROCEDURE — 84484 ASSAY OF TROPONIN QUANT: CPT

## 2021-08-15 PROCEDURE — 83880 ASSAY OF NATRIURETIC PEPTIDE: CPT

## 2021-08-15 PROCEDURE — 99284 EMERGENCY DEPT VISIT MOD MDM: CPT

## 2021-08-15 PROCEDURE — 85025 COMPLETE CBC W/AUTO DIFF WBC: CPT

## 2021-08-15 PROCEDURE — 80076 HEPATIC FUNCTION PANEL: CPT

## 2021-08-15 PROCEDURE — 71045 X-RAY EXAM CHEST 1 VIEW: CPT

## 2021-08-15 PROCEDURE — 96361 HYDRATE IV INFUSION ADD-ON: CPT

## 2021-08-15 PROCEDURE — 96374 THER/PROPH/DIAG INJ IV PUSH: CPT

## 2021-08-15 PROCEDURE — 85610 PROTHROMBIN TIME: CPT

## 2021-08-15 PROCEDURE — 36415 COLL VENOUS BLD VENIPUNCTURE: CPT

## 2021-08-15 PROCEDURE — 83735 ASSAY OF MAGNESIUM: CPT

## 2021-08-15 PROCEDURE — 93005 ELECTROCARDIOGRAM TRACING: CPT

## 2021-08-15 NOTE — EDPHYS
Physician Documentation                                                                           

 [facility]                                                                 

Name: Zofia Rudd Jr                                                                              

Age: 85 yrs                                                                                       

Sex: Male                                                                                         

: 1935                                                                                   

MRN: [id number]                                                                                   

Arrival Date: 08/15/2021                                                                          

Time: 04:03                                                                                       

Account#: [id number]                                                                            

Bed 25                                                                                            

Private MD:                                                                                       

[facility] Physician Eduardo Browne                                                                     

HPI:                                                                                              

08/15                                                                                             

09:14 This 85 yrs old  Male presents to ER via Ambulatory with complaints of          tw4 

      Vomiting/Diarrhea.                                                                          

09:14 The patient presents to the emergency department with diarrhea. Onset: The              tw4 

      symptoms/episode began/occurred today. Possible causes: unknown. The symptoms are           

      aggravated by nothing. The symptoms are alleviated by nothing. Severity of symptoms: At     

      their worst the symptoms were moderate in the emergency department the symptoms are         

      unchanged. The patient has not experienced similar symptoms in the past.                    

                                                                                                  

Historical:                                                                                       

- Allergies:                                                                                      

05:06 No Known Allergies;                                                                     em  

- PMHx:                                                                                           

05:06 chemotherapy; multiple myeloma; Hypertension;                                           em  

                                                                                                  

- Immunization history:: Client reports having NOT received the Covid vaccine.                    

- Social history:: Smoking status: Patient denies any tobacco usage or history of.                

                                                                                                  

                                                                                                  

ROS:                                                                                              

09:14 Constitutional: Negative for fever, chills, and weight loss, Cardiovascular: Negative   tw4 

      for chest pain, palpitations, and edema, Respiratory: Negative for shortness of breath,     

      cough, wheezing, and pleuritic chest pain, Back: Negative for injury and pain,              

      MS/Extremity: Negative for injury and deformity, Skin: Negative for injury, rash, and       

      discoloration, Neuro: Negative for headache, weakness, numbness, tingling, and seizure.     

09:14 Abdomen/GI: Positive for nausea and vomiting, nausea, vomiting, and diarrhea, nausea,       

      vomiting, diarrhea, Negative for abdominal pain, abdominal cramps, abdominal                

      distension, anorexia, dysphagia, hematemesis, black/tarry stool, rectal pain.               

                                                                                                  

Exam:                                                                                             

09:14 Constitutional:  This is a well developed, well nourished patient who is awake, alert,  tw4 

      and in no acute distress. Head/Face:  Normocephalic, atraumatic. Chest/axilla:  Normal      

      chest wall appearance and motion.  Nontender with no deformity.  No lesions are             

      appreciated. Cardiovascular:  Regular rate and rhythm with a normal S1 and S2.  No          

      gallops, murmurs, or rubs.  Normal PMI, no JVD.  No pulse deficits. Respiratory:  Lungs     

      have equal breath sounds bilaterally, clear to auscultation and percussion.  No rales,      

      rhonchi or wheezes noted.  No increased work of breathing, no retractions or nasal          

      flaring. Skin:  Warm, dry with normal turgor.  Normal color with no rashes, no lesions,     

      and no evidence of cellulitis.                                                              

                                                                                                  

Vital Signs:                                                                                      

05:04  / 69; Pulse 90; Resp 16; Temp 98.1; Pulse Ox 100% on R/A; Weight 95.71 kg;       em  

07:52  / 80; Pulse 78; Resp 18; Pulse Ox 100% on R/A;                                   tr6 

09:30  / 72; Pulse 60; Resp 18; Pulse Ox 98% on R/A;                                    tr6 

                                                                                                  

MDM:                                                                                              

07:36 Patient medically screened.                                                             tw4 

17:34 Data reviewed: vital signs, nurses notes. Data interpreted: Pulse oximetry:             tw4 

      Interpretation: normal. Counseling: I had a detailed discussion with the patient and/or     

      guardian regarding: the historical points, exam findings, and any diagnostic results        

      supporting the discharge/admit diagnosis.                                                   

17:35 Special discussion: I discussed with the patient/guardian in detail that at this point  tw4 

      there is no indication for admission to the hospital. It is understood, however, that       

      if the symptoms persist or worsen the patient needs to return immediately for               

      re-evaluation.                                                                              

                                                                                                  

08/15                                                                                             

05:16 Order name: Basic Metabolic Panel; Complete Time: 07:36                                 em  

08/15                                                                                             

05:16 Order name: CBC with Diff; Complete Time: 07:36                                         em  

08/15                                                                                             

05:16 Order name: LFT's; Complete Time: 07:36                                                 em  

08/15                                                                                             

05:16 Order name: Magnesium; Complete Time: 07:36                                             em  

08/15                                                                                             

05:16 Order name: NT PRO-BNP; Complete Time: 07:36                                            em  

08/15                                                                                             

05:16 Order name: PT-INR; Complete Time: 07:36                                                em  

08/15                                                                                             

05:16 Order name: Troponin (emerg Dept Use Only); Complete Time: 07:36                        em  

08/15                                                                                             

05:16 Order name: XRAY Chest (1 view); Complete Time: 08:14                                   em  

08/15                                                                                             

05:16 Order name: EKG; Complete Time: 05:17                                                   em  

08/15                                                                                             

05:16 Order name: Cardiac monitoring; Complete Time: 07:40                                    em  

08/15                                                                                             

05:16 Order name: EKG - Nurse/Tech; Complete Time: 07:19                                      em  

08/15                                                                                             

06:40 Order name: CBC Smear Scan; Complete Time: 07:36                                        EDMS

08/15                                                                                             

05:16 Order name: IV Saline Lock; Complete Time: 07:19                                        em  

08/15                                                                                             

05:16 Order name: Labs collected and sent; Complete Time: 07:19                               em  

08/15                                                                                             

05:16 Order name: O2 Per Protocol; Complete Time: 07:19                                       em  

08/15                                                                                             

05:16 Order name: O2 Sat Monitoring; Complete Time: 07:19                                     em  

                                                                                                  

Administered Medications:                                                                         

07:44 Drug: Zofran (Ondansetron) 4 mg Route: IVP; Site: left antecubital;                     tr6 

09:40 Follow up: Response: No adverse reaction                                                tr6 

07:44 Drug: NS 0.9% 500 ml Route: IV; Rate: bolus; Site: left antecubital;                    tr6 

09:40 Follow up: Response: No adverse reaction; IV Status: Completed infusion; IV Intake:     tr6 

      500ml                                                                                       

                                                                                                  

                                                                                                  

Disposition Summary:                                                                              

08/15/21 09:16                                                                                    

Discharge Ordered                                                                                 

      Location: Home                                                                          tw4 

      Problem: new                                                                            tw4 

      Symptoms: have improved                                                                 tw4 

      Condition: Stable                                                                       tw4 

      Diagnosis                                                                                   

        - Other viral enteritis                                                               tw4 

      Followup:                                                                               tw4 

        - With: Private Physician                                                                  

        - When: Upon discharge from the Emergency Department                                       

        - Reason: Recheck today's complaints, Continuance of care, Re-evaluation by your           

      physician                                                                                   

      Discharge Instructions:                                                                     

        - Discharge Summary Sheet                                                             tw4 

        - Diarrhea, Adult                                                                     tw4 

        - Vomiting, Adult                                                                     tw4 

      Forms:                                                                                      

        - Medication Reconciliation Form                                                      tw4 

        - Thank You Letter                                                                    tw4 

        - Antibiotic Education                                                                tw4 

        - Prescription Opioid Use                                                             tw4 

      Prescriptions:                                                                              

        - Zofran 4 mg Oral Tablet                                                                  

            - take 1 tablet by ORAL route every 12 hours As needed; 20 tablet; Refills: 0,    tw4 

      Product Selection Permitted                                                                 

        - Lomotil 2.5-0.025 mg Oral Tablet                                                         

            - take 1 tablet by ORAL route every 6 hours As needed; 20 tablet; Refills: 0,     tw4 

      Product Selection Permitted                                                                 

Signatures:                                                                                       

Dispatcher [employer]                           Keegan Del Rio RN RN   em                                                   

Eduardo Browne MD MD   tw4                                                  

Robyn Alamo RN                   RN   tr6                                                  

                                                                                                  

**************************************************************************************************

## 2021-08-15 NOTE — XMS REPORT
Continuity of Care Document

                           Created on:August 15, 2021



Patient:JULIO HICKEY

Sex:Male

:1935

External Reference #:[id number]





Demographics







                          Address                   [address] [place] [address]

 

                          Home Phone                [phone]

 

                          Mobile Phone              [phone]

 

                          Email Address             [email or web address]

 

                          Preferred Language        English

 

                          Marital Status            Unknown

 

                          Congregational Affiliation     Unknown

 

                          Race                      Unknown

 

                          Additional Race(s)        Unavailable



                                                    Unavailable



                                                    White

 

                          Ethnic Group              Unknown









Author







                          Organization              [facility]

t

 

                          Address                   [address]



                                                    [place] [address]

 

                          Phone                     [phone]









Support







                Name            Relationship    Address         Phone

 

                Niecy Hickey    Spouse          Unavailable     [phone]









Care Team Providers







                    Name                Role                Phone

 

                    Only, Adc Test      Attending Clinician Unavailable

 

                    Angelica RN             Attending Clinician Unavailable









Problems

This patient has no known problems.



Allergies, Adverse Reactions, Alerts

This patient has no known allergies or adverse reactions.



Medications

This patient has no known medications.



Procedures

This patient has no known procedures.



Encounters







        Start   End     Encounter Admission Attending Care    Care    Encounter 

Source



        Date/Time Date/Time Type    Type    Clinicians Facility Department ID   

   

 

        2021 Outpatient                 [facility]    7500   

 [facility]



        08:39:00 08:39:00                                                 

 

        2020 Laboratory         Only, [facility]    [id number]

[id number] 



        12:19:11 12:34:11 Only            Test    [place] [id number]         



                                                [place] [id number]         



                                                [place]  [phone]         



                                                        353             

 

        2020 Letter          Meghan Dominguez    [id number]

[id number] 



        00:00:00 00:00:00 (Out)                   FRIEDA   [id number]         



                                                [facility] [id number]         



                                                        [phone]         



                                                        019             







Results

This patient has no known results.

## 2021-08-15 NOTE — RAD REPORT
EXAM DESCRIPTION:  RAD - Chest Single View - 8/15/2021 5:28 am

 

CLINICAL HISTORY:  CHEST PAIN

 

COMPARISON:  Chest Single View dated 7/12/2021; Chest Single View dated 7/11/2021; Chest Single View 
dated 7/9/2021; CHEST PA AND LAT 2 VIEW dated 2/7/2013

 

FINDINGS:  No evidence of edema or pneumonia. Cardiomegaly.No acute osseous abnormality. No significa
nt pleural effusions or pneumothorax.

 

IMPRESSION:  No acute cardiopulmonary disease.

## 2021-08-15 NOTE — ER
Nurse's Notes                                                                                     

 [facility]                                                                 

Name: Zofia Rudd Jr                                                                              

Age: 85 yrs                                                                                       

Sex: Male                                                                                         

: 1935                                                                                   

MRN: [id number]                                                                                   

Arrival Date: 08/15/2021                                                                          

Time: 04:03                                                                                       

Account#: [id number]                                                                            

Bed 25                                                                                            

Private MD:                                                                                       

Diagnosis: Other viral enteritis                                                                  

                                                                                                  

Presentation:                                                                                     

08/15                                                                                             

05:04 Chief complaint: Patient states: vomited Thursday all day, diarrhea for 4 days, has     em  

      been taking Imodium but has not helped, pt is a chemo. pt. Coronavirus screen: Client       

      denies travel out of the [place] in the last 14 days. Ebola Screen: Patient negative for       

      fever greater than or equal to 101.5 degrees Fahrenheit, and additional compatible          

      Ebola Virus Disease symptoms Patient denies exposure to infectious person. Patient          

      denies travel to an Ebola-affected area in the 21 days before illness onset. No             

      symptoms or risks identified at this time. Initial Sepsis Screen: Does the patient meet     

      any 2 criteria? No. Patient's initial sepsis screen is negative. Does the patient have      

      a suspected source of infection? No. Patient's initial sepsis screen is negative. Risk      

      Assessment: Do you want to hurt yourself or someone else? Patient reports no desire to      

      harm self or others. Onset of symptoms was August 15, 2021.                                 

05:04 Method Of Arrival: Ambulatory                                                           em  

05:04 Acuity: SHARA 3                                                                           em  

                                                                                                  

Historical:                                                                                       

- Allergies:                                                                                      

05:06 No Known Allergies;                                                                     em  

- PMHx:                                                                                           

05:06 chemotherapy; multiple myeloma; Hypertension;                                           em  

                                                                                                  

- Immunization history:: Client reports having NOT received the Covid vaccine.                    

- Social history:: Smoking status: Patient denies any tobacco usage or history of.                

                                                                                                  

                                                                                                  

Screenin:33 Abuse screen: Denies threats or abuse. Denies injuries from another. Nutritional        tr6 

      screening: No deficits noted. Tuberculosis screening: No symptoms or risk factors           

      identified. Fall Risk None identified.                                                      

                                                                                                  

Assessment:                                                                                       

05:00 General: Appears in no apparent distress. comfortable, Behavior is calm, cooperative,   em  

      appropriate for age. Pain: Denies pain. Neuro: Level of Consciousness is awake, alert,      

      obeys commands, Oriented to person, place, time, situation. Cardiovascular: Capillary       

      refill < 3 seconds Patient's skin is warm and dry. Respiratory: Airway is patent            

      Respiratory effort is even, unlabored, Respiratory pattern is regular, symmetrical. GI:     

      Abdomen is flat, Reports nausea, vomiting. Derm: Skin is intact, is thin, Skin is pink,     

      warm \T\ dry. Musculoskeletal: Capillary refill < 3 seconds, Range of motion: intact in     

      all extremities.                                                                            

                                                                                                  

Vital Signs:                                                                                      

05:04  / 69; Pulse 90; Resp 16; Temp 98.1; Pulse Ox 100% on R/A; Weight 95.71 kg;       em  

07:52  / 80; Pulse 78; Resp 18; Pulse Ox 100% on R/A;                                   tr6 

09:30  / 72; Pulse 60; Resp 18; Pulse Ox 98% on R/A;                                    tr6 

                                                                                                  

ED Course:                                                                                        

04:03 Patient arrived in ED.                                                                  bp1 

05:06 Triage completed.                                                                       em  

05:06 Arm band placed on.                                                                     em  

05:28 XRAY Chest (1 view) In Process Unspecified.                                             EDMS

05:30 Initial lab(s) drawn, by me, sent to lab. Inserted saline lock: 20 gauge in right       em  

      forearm, using aseptic technique. Blood collected.                                          

07:33 Robyn Alamo, RN is Primary Nurse.                                                 tr6 

07:33 Resting quietly.                                                                        tr6 

07:33 Patient has correct armband on for positive identification. Bed in low position. Call   tr6 

      light in reach. Side rails up X 1. Pulse ox on. NIBP on.                                    

07:33 No provider procedures requiring assistance completed.                                  tr6 

07:36 Eduardo Browne MD is Attending Physician.                                            tw4 

09:26 IV discontinued, intact, bleeding controlled, No redness/swelling at site. Pressure     tr6 

      dressing applied.                                                                           

                                                                                                  

Administered Medications:                                                                         

07:44 Drug: Zofran (Ondansetron) 4 mg Route: IVP; Site: left antecubital;                     tr6 

09:40 Follow up: Response: No adverse reaction                                                tr6 

07:44 Drug: NS 0.9% 500 ml Route: IV; Rate: bolus; Site: left antecubital;                    tr6 

09:40 Follow up: Response: No adverse reaction; IV Status: Completed infusion; IV Intake:     tr6 

      500ml                                                                                       

                                                                                                  

                                                                                                  

Intake:                                                                                           

09:40 IV: 500ml; Total: 500ml.                                                                tr6 

                                                                                                  

Outcome:                                                                                          

09:16 Discharge ordered by MD.                                                                tw4 

09:26 Discharged to home via wheelchair, daughter to pick pt up. pt wheeled out by PCT        tr6 

09:26 Condition: stable                                                                           

09:26 Discharge instructions given to patient, RN spoke with pts daughter on phone                

      Instructed on discharge instructions, follow up and referral plans. medication usage,       

      safety practices, Demonstrated understanding of instructions, follow-up care,               

      medications, Prescriptions given X 2.                                                       

09:40 Patient left the ED.                                                                    tr6 

                                                                                                  

Signatures:                                                                                       

Dispatcher [employer]                           Keegan Del Rio RN                        RN   Eduardo Linares MD MD   tw4                                                  

Precious Lucas Tiffany, RN                   RN   tr6                                                  

                                                                                                  

Corrections: (The following items were deleted from the chart)                                    

:22 06:00 Inserted saline lock: 20 gauge in right forearm, using aseptic technique. Blood   em  

      collected. em                                                                               

: 06:00 Initial lab(s) drawn, by me, sent to lab. em                                      em  

                                                                                                  

**************************************************************************************************

## 2021-08-19 ENCOUNTER — HOSPITAL ENCOUNTER (INPATIENT)
Dept: HOSPITAL 97 - ER | Age: 86
LOS: 3 days | Discharge: HOME | DRG: 683 | End: 2021-08-22
Attending: HOSPITALIST | Admitting: HOSPITALIST
Payer: COMMERCIAL

## 2021-08-19 VITALS — BODY MASS INDEX: 32 KG/M2

## 2021-08-19 DIAGNOSIS — E83.42: ICD-10-CM

## 2021-08-19 DIAGNOSIS — N18.4: ICD-10-CM

## 2021-08-19 DIAGNOSIS — E87.6: ICD-10-CM

## 2021-08-19 DIAGNOSIS — Z79.01: ICD-10-CM

## 2021-08-19 DIAGNOSIS — E87.2: ICD-10-CM

## 2021-08-19 DIAGNOSIS — E46: ICD-10-CM

## 2021-08-19 DIAGNOSIS — D63.8: ICD-10-CM

## 2021-08-19 DIAGNOSIS — B96.20: ICD-10-CM

## 2021-08-19 DIAGNOSIS — E87.0: ICD-10-CM

## 2021-08-19 DIAGNOSIS — J44.9: ICD-10-CM

## 2021-08-19 DIAGNOSIS — E83.51: ICD-10-CM

## 2021-08-19 DIAGNOSIS — I12.9: ICD-10-CM

## 2021-08-19 DIAGNOSIS — C90.00: ICD-10-CM

## 2021-08-19 DIAGNOSIS — N39.0: ICD-10-CM

## 2021-08-19 DIAGNOSIS — R19.7: ICD-10-CM

## 2021-08-19 DIAGNOSIS — I48.20: ICD-10-CM

## 2021-08-19 DIAGNOSIS — N17.9: Primary | ICD-10-CM

## 2021-08-19 DIAGNOSIS — Z20.822: ICD-10-CM

## 2021-08-19 DIAGNOSIS — Z85.46: ICD-10-CM

## 2021-08-19 LAB
ALBUMIN SERPL BCP-MCNC: 3 G/DL (ref 3.4–5)
ALP SERPL-CCNC: 78 U/L (ref 45–117)
ALT SERPL W P-5'-P-CCNC: 23 U/L (ref 12–78)
AST SERPL W P-5'-P-CCNC: 16 U/L (ref 15–37)
BUN BLD-MCNC: 34 MG/DL (ref 7–18)
GLUCOSE SERPLBLD-MCNC: 96 MG/DL (ref 74–106)
HCT VFR BLD CALC: 32.1 % (ref 39.6–49)
LIPASE SERPL-CCNC: 261 U/L (ref 73–393)
LYMPHOCYTES # SPEC AUTO: 0.6 K/UL (ref 0.7–4.9)
MAGNESIUM SERPL-MCNC: 1.5 MG/DL (ref 1.8–2.4)
PMV BLD: 10.5 FL (ref 7.6–11.3)
POTASSIUM SERPL-SCNC: 3.2 MMOL/L (ref 3.5–5.1)
RBC # BLD: 3.27 M/UL (ref 4.33–5.43)

## 2021-08-19 PROCEDURE — 84132 ASSAY OF SERUM POTASSIUM: CPT

## 2021-08-19 PROCEDURE — 87324 CLOSTRIDIUM AG IA: CPT

## 2021-08-19 PROCEDURE — 87209 SMEAR COMPLEX STAIN: CPT

## 2021-08-19 PROCEDURE — 96366 THER/PROPH/DIAG IV INF ADDON: CPT

## 2021-08-19 PROCEDURE — 84145 PROCALCITONIN (PCT): CPT

## 2021-08-19 PROCEDURE — 87186 SC STD MICRODIL/AGAR DIL: CPT

## 2021-08-19 PROCEDURE — 96375 TX/PRO/DX INJ NEW DRUG ADDON: CPT

## 2021-08-19 PROCEDURE — 83690 ASSAY OF LIPASE: CPT

## 2021-08-19 PROCEDURE — 84443 ASSAY THYROID STIM HORMONE: CPT

## 2021-08-19 PROCEDURE — 83735 ASSAY OF MAGNESIUM: CPT

## 2021-08-19 PROCEDURE — 84100 ASSAY OF PHOSPHORUS: CPT

## 2021-08-19 PROCEDURE — 81015 MICROSCOPIC EXAM OF URINE: CPT

## 2021-08-19 PROCEDURE — 87177 OVA AND PARASITES SMEARS: CPT

## 2021-08-19 PROCEDURE — 87045 FECES CULTURE AEROBIC BACT: CPT

## 2021-08-19 PROCEDURE — 81003 URINALYSIS AUTO W/O SCOPE: CPT

## 2021-08-19 PROCEDURE — 87077 CULTURE AEROBIC IDENTIFY: CPT

## 2021-08-19 PROCEDURE — 87425 ROTAVIRUS AG IA: CPT

## 2021-08-19 PROCEDURE — 84439 ASSAY OF FREE THYROXINE: CPT

## 2021-08-19 PROCEDURE — 85025 COMPLETE CBC W/AUTO DIFF WBC: CPT

## 2021-08-19 PROCEDURE — 87088 URINE BACTERIA CULTURE: CPT

## 2021-08-19 PROCEDURE — 80061 LIPID PANEL: CPT

## 2021-08-19 PROCEDURE — 83605 ASSAY OF LACTIC ACID: CPT

## 2021-08-19 PROCEDURE — 99283 EMERGENCY DEPT VISIT LOW MDM: CPT

## 2021-08-19 PROCEDURE — 87449 NOS EACH ORGANISM AG IA: CPT

## 2021-08-19 PROCEDURE — 71045 X-RAY EXAM CHEST 1 VIEW: CPT

## 2021-08-19 PROCEDURE — 82550 ASSAY OF CK (CPK): CPT

## 2021-08-19 PROCEDURE — 96365 THER/PROPH/DIAG IV INF INIT: CPT

## 2021-08-19 PROCEDURE — 80048 BASIC METABOLIC PNL TOTAL CA: CPT

## 2021-08-19 PROCEDURE — 87086 URINE CULTURE/COLONY COUNT: CPT

## 2021-08-19 PROCEDURE — 80076 HEPATIC FUNCTION PANEL: CPT

## 2021-08-19 PROCEDURE — 74176 CT ABD & PELVIS W/O CONTRAST: CPT

## 2021-08-19 PROCEDURE — 82274 ASSAY TEST FOR BLOOD FECAL: CPT

## 2021-08-19 PROCEDURE — 84550 ASSAY OF BLOOD/URIC ACID: CPT

## 2021-08-19 PROCEDURE — 80053 COMPREHEN METABOLIC PANEL: CPT

## 2021-08-19 PROCEDURE — 36415 COLL VENOUS BLD VENIPUNCTURE: CPT

## 2021-08-19 PROCEDURE — 76770 US EXAM ABDO BACK WALL COMP: CPT

## 2021-08-19 PROCEDURE — 87046 STOOL CULTR AEROBIC BACT EA: CPT

## 2021-08-19 NOTE — ER
Nurse's Notes                                                                                     

 [facility]                                                                 

Name: Zofia Rudd Jr                                                                              

Age: 85 yrs                                                                                       

Sex: Male                                                                                         

: 1935                                                                                   

MRN: [id number]                                                                                   

Arrival Date: 2021                                                                          

Time: 19:15                                                                                       

Account#: [id number]                                                                            

Bed 12                                                                                            

Private MD:                                                                                       

Diagnosis: Diarrhea, unspecified;Acute kidney failure, unspecified                                

                                                                                                  

Presentation:                                                                                     

                                                                                             

19:36 Chief complaint: Patient's son or daughter states: Nausea, vomiting, diarrhea x 7 days. kg  

      Came to ER  was told he was dehydrated. Went to see his CA  today and got     

      labs, and stool sample. received phone call saying Bicarb was 14 and to bring him in.       

      Coronavirus screen: Client denies travel out of the [place] in the last 14 days. At this       

      time, unable to obtain information related to travel outside the [place] At this time, the     

      client does not indicate any symptoms associated with coronavirus-19. Ebola Screen:         

      Patient negative for fever greater than or equal to 101.5 degrees Fahrenheit, and           

      additional compatible Ebola Virus Disease symptoms Patient denies exposure to               

      infectious person. Patient denies travel to an Ebola-affected area in the 21 days           

      before illness onset. Initial Sepsis Screen: Does the patient meet any 2 criteria? No.      

      Patient's initial sepsis screen is negative. Does the patient have a suspected source       

      of infection? No. Patient's initial sepsis screen is negative. Risk Assessment: Do you      

      want to hurt yourself or someone else? Patient reports no desire to harm self or            

      others. Onset of symptoms was 2021.                                              

19:36 Method Of Arrival: Wheelchair                                                           kg  

19:36 Acuity: SHARA 3                                                                           kg  

                                                                                                  

Triage Assessment:                                                                                

19:40 General: Appears in no apparent distress. Behavior is calm, cooperative, appropriate    kg  

      for age, quiet. Pain: Denies pain.                                                          

                                                                                                  

Historical:                                                                                       

- Allergies:                                                                                      

19:40 No Known Allergies;                                                                     kg  

- Home Meds:                                                                                      

19:40 Metoprolol Tartrate Oral [Active]; Omeprazole Oral [Active]; Revlimid Oral [Active];    kg  

      valacyclovir Oral [Active]; Zofran 4 mg Oral tab 1 tab [Active]; dexamethasone 16 mg        

      Oral tab On chemo days [Active]; Lasix 20 mg Oral tab 1 tab once daily [Active];            

      Eliquis 2.5 mg oral tab 1 tab 2 times per day [Active];                                     

- PMHx:                                                                                           

19:40 multiple myeloma; Hypertension; chemotherapy; GERD; CKD; A-fib;                         kg  

- PSHx:                                                                                           

19:40 Cholecystectomy; Cataract; Prostectomy;                                                 kg  

                                                                                                  

- Immunization history:: Adult Immunizations up to date, Client reports receiving the             

  2nd dose of the Covid vaccine, Date received: 2021 Pfizer Client reports               

  receiving the 1st dose of the Covid vaccine, 2021 Pfizer.                              

- Social history:: Smoking status: Patient denies any tobacco usage or history of.                

                                                                                                  

                                                                                                  

Screenin:34 Abuse screen: Denies threats or abuse. Nutritional screening: No deficits noted.        lh3 

      Tuberculosis screening: No symptoms or risk factors identified. Fall Risk None              

      identified.                                                                                 

                                                                                                  

Assessment:                                                                                       

20:34 Reassessment: Patient appears in no apparent distress at this time. General: Appears in lh3 

      no apparent distress. Behavior is calm, cooperative, appropriate for age. GI: Reports       

      diarrhea.                                                                                   

                                                                                                  

Vital Signs:                                                                                      

19:36  / 59; Pulse 110; Resp 20; Temp 98.8(TE); Pulse Ox 100% ; Weight 95.71 kg (R);    kg  

      Height 5 ft. 8 in. (172.72 cm); Pain 0/10;                                                  

20:37 Pulse 78; Resp 18; Pulse Ox 100% ;                                                      lh3 

19:36 Body Mass Index 32.08 (95.71 kg, 172.72 cm)                                             kg  

                                                                                                  

ED Course:                                                                                        

19:15 Patient arrived in ED.                                                                  cf2 

19:40 Triage completed.                                                                       kg  

19:40 Arm band placed on right wrist.                                                         kg  

20:27 Clint Orozco PA is PHCP.                                                                cp  

20:27 Indra Agustin MD is Attending Physician.                                                cp  

20:28 Guera Joel, KATHY is Primary Nurse.                                                   lh3 

20:34 Patient has correct armband on for positive identification. Bed in low position. Call   3 

      light in reach. Side rails up X 1.                                                          

20:34 No provider procedures requiring assistance completed.                                  lh3 

21:10 XRAY Chest (1 view) In Process Unspecified.                                             EDMS

21:14 Basic Metabolic Panel Sent.                                                             lh3 

22:19 Lobo Lopez MD is Hospitalizing Provider.                                          cp  

                                                                                             

01:58 Occult Blood Sent.                                                                      lh3 

01:58 Ova And Parasites Sent.                                                                 lh3 

01:58 Rotavirus Antigen Sent.                                                                 lh3 

01:58 Stool Culture Sent.                                                                     lh3 

                                                                                                  

Administered Medications:                                                                         

                                                                                             

21:23 Drug: Zofran (Ondansetron) 4 mg Route: IVP; Site: right forearm;                        [facility] 

21:23 Follow up: Response: No adverse reaction                                                [facility] 

22:51 Drug: Potassium Chloride 20 mEq Route: IV; Rate: calculated rate; Site: right forearm;  [facility] 

22:51 Drug: D5W with Sodium Bicarbonate 100 mEq/L 1000 ml Route: IV; Rate: 75 ml/hr; Site:    [facility] 

      right forearm;                                                                              

22:52 Drug: Magnesium Sulfate 1 grams Route: IVPB; Infused Over: 1 hrs; Site: right forearm;  [facility] 

                                                                                             

00:46 Follow up: IV Status: Completed infusion                                                [facility] 

                                                                                                  

                                                                                                  

Outcome:                                                                                          

                                                                                             

22:21 Decision to Hospitalize by Provider.                                                    jaylan  

                                                                                             

18:14 Patient left the ED.                                                                    em  

                                                                                                  

Signatures:                                                                                       

Dispatcher [employer]                           Keegan Del Rio RN                        RN   em                                                   

Clint Orozco PA PA                                                      

Kinga Li                             2                                                  

Melissa Dennis RN RN                                                      

Guera Joel RN RN   3                                                  

                                                                                                  

**************************************************************************************************

## 2021-08-19 NOTE — P.HP
Certification for Inpatient


Patient admitted to: Inpatient


With expected LOS: >2 Midnights


Patient will require the following post-hospital care: None


Practitioner: I am a practitioner with admitting privileges, knowledge of 

patient current condition, hospital course, and medical plan of care.


Services: Services provided to patient in accordance with Admission requirements

found in Title 42 Section 412.3 of the Code of Federal Regulations





<Tramaine Morin - Last Filed: 08/19/21 22:37>





Patient History


Date of Service: 08/19/21


Reason for admission: Dehydration, diarrhea, JAREK


History of Present Illness: 


85-year-old  male with history of multiple myeloma, COPD, hypertension, 

prostate cancer, CKD 4, GERD, hyperlipidemia presents the emergency department 

for diarrhea, generalized weakness.  Patient had outpatient labs done that 

demonstrated a low bicarb level and was referred to the emergency department for

further evaluation.  Patient was evaluated emergency room labs are significant 

for white blood cell count 3.7 hemoglobin 10.7 potassium 3.2 chloride 119 carbon

dioxide 15 BUN 34 creatinine 2.27 GFR 28.  Patient's baseline GFR appears to be 

around 30.  Patient reports ongoing diarrhea over the course of last 1 week, 

watery brown stool, denies any abdominal pain.  Currently C. difficile, stool 

cultures, O&P pending, CT abdomen pelvis with oral contrast pending as well.  ED

provider wishes to admit for further evaluation and management.








- Past Medical/Surgical History


-: Multiple myeloma


-: CKD 4


-: Hypertension


-: COPD


-: Hyperlipidemia


-: Anemia of chronic disease


-: Prostatectomy


-: Cholecystectomy


Psychosocial/ Personal History: Retired, lives with wife





- Family History


  ** Father


-: Heart disease





  ** Sister


-: Diabetes





- Social History


Alcohol use: No


CD- Drugs: No


Caffeine use: Yes


Place of Residence: Home





<Tramaine Morin - Last Filed: 08/19/21 22:37>


Date of Service: 08/20/21





<Lobo Lopez - Last Filed: 08/20/21 09:03>


Allergies





No Known Allergies Allergy (Verified 07/10/21 00:23)


   





Home Medications: 








Allopurinol 1 tab PO DAILY 07/10/21 


Budesonide/Formoterol Fumarate [Budesonide-Formoterol 160-4.5] 1 puff IH BID 

07/10/21 


Lenalidomide [Revlimid] 1 cap PO DAILY 07/10/21 


Omeprazole 1 cap PO DAILY 07/10/21 


Prazosin HCl 1 cap PO BEDTIME 07/10/21 


Valacyclovir [Valtrex*] 1 tab PO DAILY 07/10/21 


dexAMETHasone [Dexamethasone] 5 tab PO SEECOM 07/10/21 


Apixaban [Eliquis *] 2.5 mg PO BID #60 tablet 07/13/21 


Furosemide [Lasix] 40 mg PO DAILY #30 tab 07/13/21 


Metoprolol Succinate [Toprol Xl*] 100 mg PO DAILY #30 tab 07/13/21 


Na Bicarb Tab [Sodium Bicarb 325 MG Tab*] 650 mg PO QID #120 tab 07/13/21 








Review of Systems


10-point ROS is otherwise unremarkable


General: Weakness, Malaise


Gastrointestinal: Nausea, Diarrhea





<Tramaine Morin - Last Filed: 08/19/21 22:37>





Physical Examination





- Physical Exam


General: Alert, In no apparent distress, Oriented x3


HEENT: Atraumatic, PERRLA, Other (Mucous membranes dry), EOMI, Sclerae 

nonicteric


Neck: Supple, 2+ carotid pulse no bruit, No LAD, Without JVD or thyroid 

abnormality


Respiratory: Clear to auscultation bilaterally, Normal air movement


Cardiovascular: Normal S1 S2, Irregular heart rate/rhythm (A. fib, rate 

controlled)


Gastrointestinal: Normal bowel sounds, No tenderness


Musculoskeletal: No tenderness


Integumentary: No rashes


Neurological: Normal speech, Normal strength at 5/5 x4 extr, Normal tone, Normal

affect


Lymphatics: No axilla or inguinal lymphadenopathy





- Studies


Laboratory Data (last 24 hrs)





08/19/21 21:04: WBC 3.70 L D, Hgb 10.7 L, Hct 32.1 L, Plt Count 76 L D


08/19/21 21:04: Sodium 144, Potassium 3.2 L, BUN 34 H, Creatinine 2.27 H, 

Glucose 96, Magnesium 1.5 L, Total Bilirubin 0.3, AST 16, ALT 23, Alkaline 

Phosphatase 78, Lipase 261





Microbiology Data (last 24 hrs): 








08/19/21 20:48   Stool   Stool Occult Blood (LALA) - Final


                            NP








<Tramaine Morin - Last Filed: 08/19/21 22:37>





- Studies


Laboratory Data (last 24 hrs)





08/19/21 21:04: WBC 3.70 L D, Hgb 10.7 L, Hct 32.1 L, Plt Count 76 L D


08/19/21 21:04: Sodium 144, Potassium 3.2 L, BUN 34 H, Creatinine 2.27 H, 

Glucose 96, Magnesium 1.5 L, Total Bilirubin 0.3, AST 16, ALT 23, Alkaline 

Phosphatase 78, Lipase 261








<Lobo Lopez - Last Filed: 08/20/21 09:03>





Assessment and Plan





- Plan


Assessment:


JAREK superimposed on CKD 4 with metabolic acidosis secondary to 

diarrhea/dehydration


Multiple myeloma on chemotherapy


History of COPD


Atrial fibrillation on chronic anticoagulation therapy


Hypertension


History of prostate cancer status post prostatectomy


GERD





Plan:


JAREK superimposed on CKD 4 with metabolic acidosis secondary to 

diarrhea/dehydration: Continue with IV fluids D5W plus bicarb as any 5 cc/h 

overnight, CT abdomen pelvis pending to rule out any other pathology.  Stool 

culture, O&P, C. difficile studies pending as well.  Patient with no significant

leukocytosis.  Nephrology consulted for additional assistance in management.


Multiple myeloma on chemotherapy: Patient on chemotherapy last dose was on 

Tuesday, will continue with outpatient management.


History of COPD: Stable without exacerbation at this time will provide 

medication on a as needed basis.


Atrial fibrillation on chronic anticoagulation therapy: Continue metoprolol, 

Eliquis, patient failed cardioversion.


Hypertension: Continue medications including metoprolol.


History of prostate cancer status post prostatectomy: Stable.


GERD: Continue home medications.








DVT PPX: Eliquis 2.5 p.o. twice daily


Code status: Full


Discharge Plan: Home


Plan to discharge in: 48 Hours





- Advance Directives


Does patient have a Living Will: No


Does patient have a Durable POA for Healthcare: No





- Code Status/Comfort Care


Code Status Assessed: Yes (Full code)


Critical Care: No


Time Spent Managing Pts Care (In Minutes): 55





<Tramaine Morin - Last Filed: 08/19/21 22:37>


Physician Review: Patient Assessed, Agree with Above Assessment and Plan





<Lobo Lopez - Last Filed: 08/20/21 09:03>

## 2021-08-19 NOTE — RAD REPORT
EXAM DESCRIPTION:  RAD - Chest Single View - 8/19/2021 9:10 pm

 

CLINICAL HISTORY:  ABDOMINAL DISTENTION

Chest pain.

 

COMPARISON:  Chest Single View dated 8/15/2021; Chest Single View dated 7/12/2021; Chest Single View 
dated 7/11/2021; Chest Single View dated 7/9/2021

 

FINDINGS:  Portable technique limits examination quality.

 

The lungs are grossly clear. The heart is upper limit of normal in size. No displaced fractures.

 

IMPRESSION:  No acute intrathoracic process suspected.

## 2021-08-19 NOTE — XMS REPORT
Continuity of Care Document

                           Created on:2021



Patient:JULIO HICKEY

Sex:Male

:1935

External Reference #:[id number]





Demographics







                          Address                   [address] [place] [address]

 

                          Home Phone                [phone]

 

                          Mobile Phone              [phone]

 

                          Email Address             [email or web address]

 

                          Preferred Language        English

 

                          Marital Status            Unknown

 

                          Zoroastrianism Affiliation     Unknown

 

                          Race                      Unknown

 

                          Additional Race(s)        Unavailable



                                                    Unavailable



                                                    White

 

                          Ethnic Group              Unknown









Author







                          Organization              [facility]

t

 

                          Address                   [address]



                                                    [place] [address]

 

                          Phone                     [phone]









Support







                Name            Relationship    Address         Phone

 

                Niecy Hickey    Spouse          Unavailable     [phone]









Care Team Providers







                    Name                Role                Phone

 

                    Only, Adc Test      Attending Clinician Unavailable

 

                    Angelica RN             Attending Clinician Unavailable









Problems

This patient has no known problems.



Allergies, Adverse Reactions, Alerts

This patient has no known allergies or adverse reactions.



Medications

This patient has no known medications.



Procedures

This patient has no known procedures.



Encounters







        Start   End     Encounter Admission Attending Care    Care    Encounter 

Source



        Date/Time Date/Time Type    Type    Clinicians Facility Department ID   

   

 

        2021 Outpatient                 [id number]   

 [facility]



        08:39:00 08:39:00                                                 

 

        2020 Laboratory         Only, [facility]    [id number]

[id number] 



        12:19:11 12:34:11 Only            Test    [place] [id number]         



                                                [place] [id number]         



                                                [place]  [phone]         



                                                        353             

 

        2020 Letter          [facility] Dominguez    [id number]

[id number] 



        00:00:00 00:00:00 (Out)                   [facility]RI[facility]   [id number]         



                                                [facility] [id number]         



                                                        [phone]         



                                                        019             







Results

This patient has no known results.

## 2021-08-19 NOTE — EDPHYS
Physician Documentation                                                                           

 [facility]                                                                 

Name: Zofia Rudd Jr                                                                              

Age: 85 yrs                                                                                       

Sex: Male                                                                                         

: 1935                                                                                   

MRN: [id number]                                                                                   

Arrival Date: 2021                                                                          

Time: 19:15                                                                                       

Account#: [id number]                                                                            

Bed 12                                                                                            

Private MD:                                                                                       

ED Physician Indra Agustin                                                                         

HPI:                                                                                              

                                                                                             

20:55 This 85 yrs old  Male presents to ER via Wheelchair with complaints of          cp  

      Diarrhea, Abnormal Lab Results.                                                             

20:55 The patient presents to the emergency department with diarrhea, that is continuous.     cp  

20:55 Onset: The symptoms/episode began/occurred last week.                                   cp  

                                                                                                  

Historical:                                                                                       

- Allergies:                                                                                      

19:40 No Known Allergies;                                                                     kg  

- Home Meds:                                                                                      

19:40 Metoprolol Tartrate Oral [Active]; Omeprazole Oral [Active]; Revlimid Oral [Active];    kg  

      valacyclovir Oral [Active]; Zofran 4 mg Oral tab 1 tab [Active]; dexamethasone 16 mg        

      Oral tab On chemo days [Active]; Lasix 20 mg Oral tab 1 tab once daily [Active];            

      Eliquis 2.5 mg oral tab 1 tab 2 times per day [Active];                                     

- PMHx:                                                                                           

19:40 multiple myeloma; Hypertension; chemotherapy; GERD; CKD; A-fib;                         kg  

- PSHx:                                                                                           

19:40 Cholecystectomy; Cataract; Prostectomy;                                                 kg  

                                                                                                  

- Immunization history:: Adult Immunizations up to date, Client reports receiving the             

  2nd dose of the Covid vaccine, Date received: 2021 Pfizer Client reports               

  receiving the 1st dose of the Covid vaccine, 2021 Pfizer.                              

- Social history:: Smoking status: Patient denies any tobacco usage or history of.                

                                                                                                  

                                                                                                  

ROS:                                                                                              

21:00 Constitutional: Positive for poor PO intake, Negative for body aches, chills, fever.    cp  

21:00 Eyes: Negative for injury, pain, redness, and discharge.                                cp  

21:00 Cardiovascular: Negative for chest pain, edema, palpitations.                               

21:00 Respiratory: Negative for cough, shortness of breath, wheezing.                             

21:00 Abdomen/GI: Positive for abdominal pain, nausea, diarrhea, abdominal distension,            

      anorexia, Negative for vomiting, black/tarry stool, rectal bleeding.                        

                                                                                                  

Vital Signs:                                                                                      

19:36  / 59; Pulse 110; Resp 20; Temp 98.8(TE); Pulse Ox 100% ; Weight 95.71 kg (R);    kg  

      Height 5 ft. 8 in. (172.72 cm); Pain 0/10;                                                  

20:37 Pulse 78; Resp 18; Pulse Ox 100% ;                                                      lh3 

19:36 Body Mass Index 32.08 (95.71 kg, 172.72 cm)                                             kg  

                                                                                                  

MDM:                                                                                              

20:30 Patient medically screened.                                                             cp  

                                                                                                  

                                                                                             

20:24 Order name: Urine Dipstick-Ancillary; Complete Time: 20:27                              EDMS

                                                                                             

22:07 Interpretation: Normal except: UBLD 2+; UPROT 1+.                                       cp  

                                                                                             

20:48 Order name: Basic Metabolic Panel                                                       cp  

                                                                                             

20:48 Order name: CBC with Diff; Complete Time: 21:19                                         cp  

                                                                                             

21:19 Interpretation: Normal except: WBC 3.70; RBC 3.27; HGB 10.7; HCT 32.1; PLT 76; RDW      cp  

      18.5; MPV 10.5; LYM% 15.0; MN% 18.0; LYMA 0.6.                                              

                                                                                             

20:48 Order name: Hepatic Function; Complete Time: 21:38                                      cp  

                                                                                             

22:07 Interpretation: Normal except: TP 5.8; ALB 3.0.                                         cp  

                                                                                             

20:48 Order name: Lipase; Complete Time: 21:38                                                cp  

                                                                                             

20:48 Order name: Lactate; Complete Time: 00:50                                               cp  

                                                                                             

00:50 Interpretation: Reviewed.                                                               cp  

                                                                                             

20:48 Order name: Procalcitonin; Complete Time: 22:06                                         cp  

                                                                                             

22:07 Interpretation: Within normal limits: Procalcitonin < 0.05.                             cp  

                                                                                             

20:48 Order name: Magnesium; Complete Time: 21:38                                             cp  

                                                                                             

22:07 Interpretation: Abnormal: MG 1.5.                                                       cp  

                                                                                             

20:48 Order name: Occult Blood                                                                cp  

                                                                                             

20:48 Order name: Ova And Parasites                                                           cp  

                                                                                             

20:48 Order name: Rotavirus Antigen                                                           cp  

                                                                                             

20:48 Order name: Stool Culture                                                               cp  

                                                                                             

20:48 Order name: CDIFF                                                                       cp  

                                                                                             

20:50 Order name: Basic Metabolic Panel; Complete Time: 21:38                                 EDMS

                                                                                             

22:08 Interpretation: Normal except: K 3.2; ; CO2 15; BUN 34; CRE 2.27; GFR 28; CA 7.3. cp  

08/19                                                                                             

20:48 Order name: XRAY Chest (1 view); Complete Time: 21:29                                   cp  

                                                                                             

00:51 Interpretation: Report review.                                                          cp  

                                                                                             

23:35 Order name: Abdomen                                                                     EDMS

                                                                                             

03:48 Order name: CBC with Automated Diff                                                     EDMS

                                                                                             

04:13 Order name: Comprehensive Metabolic Panel                                               EDMS

                                                                                             

04:13 Order name: Creatine Phosphokinase                                                      EDMS

                                                                                             

04:13 Order name: Lipid Profile                                                               EDMS

                                                                                             

04:13 Order name: T4 Free                                                                     EDMS

                                                                                             

04:13 Order name: Magnesium                                                                   EDMS

                                                                                             

04:13 Order name: Thyroid Stimulating Hormone                                                 EDMS

                                                                                             

07:15 Order name: Urinalysis                                                                  EDMS

                                                                                             

07:27 Order name: Urine Microscopic Only                                                      EDMS

                                                                                             

09:07 Order name: US                                                                          EDMS

                                                                                             

15:57 Order name: COVID-19 : Document "Date of Symptom Onset" if Symptomatic.                 hb  

                                                                                             

16:19 Order name: CORONAVIRUS                                                                 EDMS

                                                                                             

17:25 Order name: SARS-COV-2 RT PCR                                                           EDMS

                                                                                             

20:48 Order name: IV Saline Lock; Complete Time: 21:14                                        cp  

                                                                                             

20:48 Order name: Labs collected and sent; Complete Time: 21:14                               cp  

                                                                                                  

Administered Medications:                                                                         

21:23 Drug: Zofran (Ondansetron) 4 mg Route: IVP; Site: right forearm;                        [facility] 

21:23 Follow up: Response: No adverse reaction                                                [facility] 

22:51 Drug: Potassium Chloride 20 mEq Route: IV; Rate: calculated rate; Site: right forearm;  [facility] 

22:51 Drug: D5W with Sodium Bicarbonate 100 mEq/L 1000 ml Route: IV; Rate: 75 ml/hr; Site:    [facility] 

      right forearm;                                                                              

22:52 Drug: Magnesium Sulfate 1 grams Route: IVPB; Infused Over: 1 hrs; Site: right forearm;  [facility] 

                                                                                             

00:46 Follow up: IV Status: Completed infusion                                                [facility] 

                                                                                                  

                                                                                                  

Disposition Summary:                                                                              

21 22:21                                                                                    

Hospitalization Ordered                                                                           

      Hospitalization Status: Inpatient Admission                                             cp  

      Provider: Lobo Lopez cp  

      Condition: Stable                                                                       cp  

      Problem: new                                                                            cp  

      Symptoms: have improved                                                                 cp  

      Bed/Room Type: Standard                                                                 cp  

      Location: Telemetry/MedSurg (Inpatient)(08/20/21 17:53)                                 eb  

      Room Assignment: 205(21 17:53)                                                    eb  

      Diagnosis                                                                                   

        - Diarrhea, unspecified                                                               cp  

        - Acute kidney failure, unspecified                                                   cp  

      Forms:                                                                                      

        - Medication Reconciliation Form                                                      cp  

        - SBAR form                                                                           cp  

Addendum:                                                                                         

2021                                                                                        

     07:02 Co-signature as Attending Physician, Indra Agustin MD I agree with the assessment and     r
n

           plan of care. Attestation: The patient's history, exam findings, diagnostics, and a    

           summary of any interventions or procedures was reviewed in detail with Clint RAZA.  

                                                                                                  

Signatures:                                                                                       

Dispatcher [employer]                           EDMS                                                 

Indra Agustin MD MD   rn                                                   

Tramaine Morin, FNP-C                      FNP-Cla1                                                  

Clint Orozco PA PA cp Garcia, Cindy RN                       RN   cg                                                   

Tiana Bravo Kristen, KATHY                     RN   kg                                                   

Guera Joel RN                     RN   lh3                                                  

                                                                                                  

Corrections: (The following items were deleted from the chart)                                    

                                                                                             

23:30 22:21 Telemetry/MedSurg (Inpatient) cp                                                  cg  

23:30 22:21 cp                                                                                cg  

23:35 21:00 Abdomen Pelvis W Con+CT.RAD.BRZ ordered. EDMS                                     EDMS

                                                                                             

17:53  23:30 [facility] ER HOLD cg                                                             eb  

                                                                                             

17:53  23:30 ERHOLD- cg                                                                  eb  

                                                                                                  

**************************************************************************************************

## 2021-08-20 LAB
ALBUMIN SERPL BCP-MCNC: 2.9 G/DL (ref 3.4–5)
ALP SERPL-CCNC: 71 U/L (ref 45–117)
ALT SERPL W P-5'-P-CCNC: 22 U/L (ref 12–78)
AST SERPL W P-5'-P-CCNC: 12 U/L (ref 15–37)
BUN BLD-MCNC: 33 MG/DL (ref 7–18)
GLUCOSE SERPLBLD-MCNC: 99 MG/DL (ref 74–106)
HCT VFR BLD CALC: 30.2 % (ref 39.6–49)
HDLC SERPL-MCNC: 35 MG/DL (ref 40–60)
LDLC SERPL CALC-MCNC: 39 MG/DL (ref ?–130)
LYMPHOCYTES # SPEC AUTO: 0.6 K/UL (ref 0.7–4.9)
MAGNESIUM SERPL-MCNC: 1.6 MG/DL (ref 1.8–2.4)
PMV BLD: 10.5 FL (ref 7.6–11.3)
POTASSIUM SERPL-SCNC: 3.3 MMOL/L (ref 3.5–5.1)
RBC # BLD: 3.12 M/UL (ref 4.33–5.43)
TSH SERPL DL<=0.05 MIU/L-ACNC: 0.58 UIU/ML (ref 0.36–3.74)
UA DIPSTICK W REFLEX MICRO PNL UR: (no result)

## 2021-08-20 RX ADMIN — SODIUM BICARBONATE TAB 325 MG SCH MG: 325 TAB at 22:06

## 2021-08-20 RX ADMIN — PRAZOSIN HYDROCHLORIDE SCH: 1 CAPSULE ORAL at 21:00

## 2021-08-20 RX ADMIN — SODIUM CHLORIDE SCH: 0.45 INJECTION, SOLUTION INTRAVENOUS at 02:16

## 2021-08-20 RX ADMIN — ONDANSETRON PRN MG: 2 INJECTION INTRAMUSCULAR; INTRAVENOUS at 20:14

## 2021-08-20 RX ADMIN — SODIUM CHLORIDE SCH MLS: 0.45 INJECTION, SOLUTION INTRAVENOUS at 17:36

## 2021-08-20 RX ADMIN — Medication SCH ML: at 21:00

## 2021-08-20 RX ADMIN — APIXABAN SCH MG: 2.5 TABLET, FILM COATED ORAL at 22:07

## 2021-08-20 RX ADMIN — Medication SCH ML: at 09:00

## 2021-08-20 RX ADMIN — SODIUM CHLORIDE SCH MG: 0.9 INJECTION, SOLUTION INTRAVENOUS at 09:00

## 2021-08-20 RX ADMIN — SODIUM CHLORIDE SCH MLS: 0.45 INJECTION, SOLUTION INTRAVENOUS at 22:07

## 2021-08-20 RX ADMIN — APIXABAN SCH MG: 2.5 TABLET, FILM COATED ORAL at 09:00

## 2021-08-20 RX ADMIN — METOPROLOL SUCCINATE SCH MG: 100 TABLET, EXTENDED RELEASE ORAL at 06:00

## 2021-08-20 NOTE — RAD REPORT
EXAM DESCRIPTION:  US - Renal Ultrasound-Complete - 8/20/2021 3:55 am

 

CLINICAL HISTORY:  JAREK, metabolic acidosis

 

COMPARISON:  Abdomen   Pelvis Wo Contrast dated 8/19/2021

 

FINDINGS:  The right kidney measures 10.5 x 5.2 x 4.5 cm.  The left kidney measures 10.8 x 5.5 x 4.4 
cm. Renal cortical thickness is normal. No hydronephrosis or suspicious renal mass. A 5 centimeter an
terior mid right renal cyst is present. A 1.5 centimeter cyst is present upper pole of the left kidne
y and 1.3 and 1.8 centimeter size cysts are present mid and lower left kidney.

 

No bladder wall thickening or mass. No intraluminal stone or mass.

 

Both kidneys show an increase in cortical echogenicity

 

IMPRESSION:  No hydronephrosis or suspicious renal mass.

 

Increased renal parenchymal echogenicity is present. This can be body habitus affect or medical renal
 disease.

## 2021-08-20 NOTE — P.PN
Subjective


Date of Service: 08/20/21


Chief Complaint: Dehydration, diarrhea, JAREK


Subjective: No C/O voiced, Improving





Physical Examination





- Vital Signs


Temperature: 98.2 F


Blood Pressure: 129/71


Pulse: 75


Respirations: 13


Pulse Ox (%): 97





- Physical Exam


General: Alert, Oriented x3


HEENT: Atraumatic, Normocephalic


Neck: Supple


Respiratory: Clear to auscultation bilaterally


Cardiovascular: Regular rate/rhythm, Normal S1 S2


Gastrointestinal: Soft and benign


Musculoskeletal: No swelling


Neurological: Normal speech, Normal strength at 5/5 x4 extr, Cranial nerves 3-12

intact





- Studies


Laboratory Data (last 24 hrs)





08/19/21 21:04: WBC 3.70 L D, Hgb 10.7 L, Hct 32.1 L, Plt Count 76 L D


08/19/21 21:04: Sodium 144, Potassium 3.2 L, BUN 34 H, Creatinine 2.27 H, 

Glucose 96, Magnesium 1.5 L, Total Bilirubin 0.3, AST 16, ALT 23, Alkaline 

Phosphatase 78, Lipase 261








Assessment And Plan





- Plan


JAREK on CKD stage 4:


His creatinine is improved at 2.2 today.


we will continue IV fluid and monitoring of renal labs.


we will avoid exposure to nephrotoxins.





Diarrhea:


Improving but still an issues. awaiting C diff labs.


We will ontinue empiric antibiotics.





COPD:


we will continue outpt inhalation therapy.





Chronic A fib:


we will continue rate control meds and anticoagulkant therapy.





Multiple myeloma:


we will continue revlimid.





Hypokalemia:


Potassium is low at 3.3. we will replete and monitor.





Hypomagnesemia:


magnesium is low at 1.5. we will replete and monitor.








Hypocalcemia:


Calcium is low at 6.6 but corrected for hypoalbuminemia, its 7.4. 


we will dose 2g of calcium gluconate one time dose and continue routine 

monitoring. 


Physician Review: Patient Assessed, Agree with Above Assessment and Plan

## 2021-08-20 NOTE — RAD REPORT
EXAM DESCRIPTION:  CT - Abdomen   Pelvis Wo Contrast - 8/20/2021 6:53 am

 

CLINICAL HISTORY:  85 years   Male   diarrhea; Abdominal distention

 

COMPARISON:  None.

 

TECHNIQUE:  Contiguous axial images obtained through the abdomen and pelvis without IV contrast. The 
study was performed following oral contrast. Reformatted images obtained.

This exam was performed according to our department optimization program which includes automated exp
osure control, adjustment of the mA and/or kv according to patient size and/or use of iterative recon
struction technique.

 

FINDINGS:  The study is slightly suboptimal from motion.

Probable minimal pleural effusions. Mild scarring/atelectasis in the lower lungs.

Moderate to large hiatal hernia.

The liver appears unremarkable.

The spleen and pancreas appear unremarkable.

No adrenal masses.

There is a right renal cyst measuring 4.7 cm. No hydronephrosis or ureteral calculi.

Changes from previous cholecystectomy.

Atherosclerotic calcifications. No aneurysmal dilatation of the aorta.

There is fluid in the colon consistent with a diarrheal state. The ingested oral contrast is visualiz
ed into the ascending colon. No bowel obstruction. The appendix appears unremarkable.   Scattered col
onic diverticula.

No significant free pelvic fluid.      There are changes from prostatectomy. There are surgical clips
 in the pelvis.

There are small fat-containing inguinal hernias greater on the left. There is some fluid in the left 
inguinal hernia.

Degenerative changes in the spine.

 

IMPRESSION:  1.   There is fluid in the colon consistent with a diarrheal state.

2.   No bowel obstruction.

3.   Moderate to large hiatal hernia.

4.   There are changes from prostatectomy.

5.   There is some fluid in a left inguinal hernia.

6.   Other findings as above.

 

Electronically signed by:   Darron Adams MD   8/20/2021 12:24 AM CDT Workstation: [id number]

 

 

Due to temporary technical issues with the PACS/Fluency reporting system, reports are being signed by
 the in house radiologist without review as a courtesy to ensure prompt reporting. The interpreting r
adiologist is fully responsible for the content of the report.

## 2021-08-20 NOTE — P.CNS
Date of Consult: 08/20/21


Reason for Consult: JAREK/ CKD


Requesting Physician: Lobo Lopez


Chief Complaint: Dehydration, diarrhea, JAREK


History of Present Illness: 





85-year-old  male with history of multiple myeloma, COPD, hypertension, 

prostate cancer, CKD 4, GERD, hyperlipidemia presents the emergency department 

for diarrhea, generalized weakness.  Patient had outpatient labs done that 

demonstrated a low bicarb level and was referred to the emergency department for

further evaluation.  Patient was evaluated emergency room labs are significant 

for white blood cell count 3.7 hemoglobin 10.7 potassium 3.2 chloride 119 carbon

dioxide 15 BUN 34 creatinine 2.27 GFR 28.  Patient's baseline GFR appears to be 

around 30.  Patient reports ongoing diarrhea over the course of last 1 week, 

watery brown stool, denies any abdominal pain.  Currently C. difficile, stool 

cultures, O&P pending, CT abdomen pelvis with oral contrast pending as well. 


Allergies





No Known Allergies Allergy (Verified 07/10/21 00:23)


   





Home medications list reviewed: Yes


Home Medications: 








Allopurinol 1 tab PO DAILY 07/10/21 


Budesonide/Formoterol Fumarate [Budesonide-Formoterol 160-4.5] 1 puff IH BID 

07/10/21 


Lenalidomide [Revlimid] 1 cap PO DAILY 07/10/21 


Omeprazole 1 cap PO DAILY 07/10/21 


Prazosin HCl 1 cap PO BEDTIME 07/10/21 


Valacyclovir [Valtrex*] 1 tab PO DAILY 07/10/21 


dexAMETHasone [Dexamethasone] 5 tab PO SEECOM 07/10/21 


Apixaban [Eliquis *] 2.5 mg PO BID #60 tablet 07/13/21 


Furosemide [Lasix] 40 mg PO DAILY #30 tab 07/13/21 


Metoprolol Succinate [Toprol Xl*] 100 mg PO DAILY #30 tab 07/13/21 


Na Bicarb Tab [Sodium Bicarb 325 MG Tab*] 650 mg PO QID #120 tab 07/13/21 








- Past Medical/Surgical History


Diabetic: No


-: Multiple myeloma


-: CKD 4


-: Hypertension


-: COPD


-: Hyperlipidemia


-: Anemia of chronic disease


-: Prostatectomy


-: Cholecystectomy


Psychosocial/ Personal History: Retired, lives with wife





- Family History


  ** Father


Medical History: Heart disease





  ** Sister


Medical History: Diabetes





- Social History


Alcohol use: No


CD- Drugs: No


Caffeine use: No


Place of Residence: Home





Review of Systems


10-point ROS is otherwise unremarkable


General: Weakness, Malaise


Gastrointestinal: Diarrhea





Physical Examination














Temp Pulse Resp BP Pulse Ox


 


 97.4 F   81   18   140/72   100 


 


 08/20/21 18:28  08/20/21 18:28  08/20/21 18:28  08/20/21 18:28  08/20/21 18:28








General: In no apparent distress, Oriented x3, Cooperative


HEENT: Atraumatic


Neck: Supple


Respiratory: Clear to auscultation bilaterally


Cardiovascular: No edema, Regular rate/rhythm


Gastrointestinal: Soft and benign, Non-distended


Musculoskeletal: No clubbing, No contractures


Integumentary: No rashes, No cyanosis


Neurological: Normal speech


Laboratory Data (last 24 hrs)





08/19/21 21:04: Sodium 144, Potassium 3.2 L, BUN 34 H, Creatinine 2.27 H, 

Glucose 96, Magnesium 1.5 L, Total Bilirubin 0.3, AST 16, ALT 23, Alkaline 

Phosphatase 78, Lipase 261





Imagings Data: 


EXAM DESCRIPTION:  RAD - Chest Single View - 8/19/2021 9:10 pm


CLINICAL HISTORY:  ABDOMINAL DISTENTION


Chest pain.


COMPARISON:  Chest Single View dated 8/15/2021; Chest Single View dated 7/12/ 2021; Chest Single View dated 7/11/2021; Chest Single View dated 7/9/2021


FINDINGS:  Portable technique limits examination quality.


The lungs are grossly clear. The heart is upper limit of normal in size. No 

displaced fractures.


IMPRESSION:  No acute intrathoracic process suspected.








EXAM DESCRIPTION:  CT - Abdomen   Pelvis Wo Contrast - 8/20/2021 6:53 am


CLINICAL HISTORY:  85 years   Male   diarrhea; Abdominal distention


COMPARISON:  None.


TECHNIQUE:  Contiguous axial images obtained through the abdomen and pelvis 

without IV contrast. The study was performed following oral contrast. 

Reformatted images obtained.


This exam was performed according to our department optimization program which 

includes automated exposure control, adjustment of the mA and/or kv according to

patient size and/or use of iterative reconstruction technique.


FINDINGS:  The study is slightly suboptimal from motion.


Probable minimal pleural effusions. Mild scarring/atelectasis in the lower denise

ngs.


Moderate to large hiatal hernia.


The liver appears unremarkable.


The spleen and pancreas appear unremarkable.


No adrenal masses.


There is a right renal cyst measuring 4.7 cm. No hydronephrosis or ureteral 

calculi.


Changes from previous cholecystectomy.


Atherosclerotic calcifications. No aneurysmal dilatation of the aorta.


There is fluid in the colon consistent with a diarrheal state. The ingested oral

contrast is visualized into the ascending colon. No bowel obstruction. The 

appendix appears unremarkable.   Scattered colonic diverticula.


No significant free pelvic fluid.      There are changes from prostatectomy. 

There are surgical clips in the pelvis.


There are small fat-containing inguinal hernias greater on the left. There is 

some fluid in the left inguinal hernia.


Degenerative changes in the spine.


IMPRESSION:  1.   There is fluid in the colon consistent with a diarrheal state.


2.   No bowel obstruction.


3.   Moderate to large hiatal hernia.


4.   There are changes from prostatectomy.


5.   There is some fluid in a left inguinal hernia.


6.   Other findings as above.











EXAM DESCRIPTION:  US - Renal Ultrasound-Complete - 8/20/2021 3:55 am


CLINICAL HISTORY:  JAREK, metabolic acidosis


COMPARISON:  Abdomen   Pelvis Wo Contrast dated 8/19/2021


FINDINGS:  The right kidney measures 10.5 x 5.2 x 4.5 cm.  The left kidney 

measures 10.8 x 5.5 x 4.4 cm. Renal cortical thickness is normal. No 

hydronephrosis or suspicious renal mass. A 5 centimeter anterior mid right renal

cyst is present. A 1.5 centimeter cyst is present upper pole of the left kidney 

and 1.3 and 1.8 centimeter size cysts are present mid and lower left kidney.


No bladder wall thickening or mass. No intraluminal stone or mass.


Both kidneys show an increase in cortical echogenicity


IMPRESSION:  No hydronephrosis or suspicious renal mass.


Increased renal parenchymal echogenicity is present. This can be body habitus 

affect or medical renal disease.


Conclusions/Impression: 


JAREK likely due to hypovolemia


CKD IV in the setting of MM


-No NSAIDs


-Change IVF 1/2NS





Hypokalemia


-Recommend liquid potassium





Acidosis


-Start oral bicarb


-Received IV bicarb





Hypocalcemia


-Replete prn


-Start vitamin D3 and calcitriol





Hypomagnesemia


-Replete IV mag prn





Moderate malnutrition


-Encourage nutrition





Anemia in chronic illness


-Monitor H&H





Diarrhea


-Check C.diff





Thank you kindly for the consultation

## 2021-08-21 LAB
ALBUMIN SERPL BCP-MCNC: 2.8 G/DL (ref 3.4–5)
ALP SERPL-CCNC: 71 U/L (ref 45–117)
ALT SERPL W P-5'-P-CCNC: 20 U/L (ref 12–78)
AST SERPL W P-5'-P-CCNC: 12 U/L (ref 15–37)
BUN BLD-MCNC: 25 MG/DL (ref 7–18)
GLUCOSE SERPLBLD-MCNC: 80 MG/DL (ref 74–106)
HCT VFR BLD CALC: 30.4 % (ref 39.6–49)
LYMPHOCYTES # SPEC AUTO: 0.9 K/UL (ref 0.7–4.9)
MAGNESIUM SERPL-MCNC: 1.7 MG/DL (ref 1.8–2.4)
PMV BLD: 10.8 FL (ref 7.6–11.3)
POTASSIUM SERPL-SCNC: 2.9 MMOL/L (ref 3.5–5.1)
RBC # BLD: 3.17 M/UL (ref 4.33–5.43)
URATE SERPL-MCNC: 6.7 MG/DL (ref 3.5–7.2)

## 2021-08-21 RX ADMIN — APIXABAN SCH MG: 2.5 TABLET, FILM COATED ORAL at 11:44

## 2021-08-21 RX ADMIN — SODIUM CHLORIDE SCH MG: 0.9 INJECTION, SOLUTION INTRAVENOUS at 11:44

## 2021-08-21 RX ADMIN — CHOLECALCIFEROL CAP 125 MCG (5000 UNIT) SCH UNIT: 125 CAP at 11:44

## 2021-08-21 RX ADMIN — SODIUM CHLORIDE SCH MLS: 0.45 INJECTION, SOLUTION INTRAVENOUS at 11:41

## 2021-08-21 RX ADMIN — SODIUM BICARBONATE TAB 325 MG SCH MG: 325 TAB at 16:03

## 2021-08-21 RX ADMIN — SODIUM CHLORIDE SCH: 0.45 INJECTION, SOLUTION INTRAVENOUS at 08:00

## 2021-08-21 RX ADMIN — ONDANSETRON PRN MG: 2 INJECTION INTRAMUSCULAR; INTRAVENOUS at 17:27

## 2021-08-21 RX ADMIN — DOCUSATE SODIUM SCH: 100 CAPSULE, LIQUID FILLED ORAL at 21:00

## 2021-08-21 RX ADMIN — Medication SCH ML: at 21:43

## 2021-08-21 RX ADMIN — DIPHENOXYLATE HYDROCHLORIDE AND ATROPINE SULFATE PRN TAB: 2.5; .025 TABLET ORAL at 17:50

## 2021-08-21 RX ADMIN — CEFEPIME SCH MLS: 1 INJECTION, POWDER, FOR SOLUTION INTRAMUSCULAR; INTRAVENOUS at 11:42

## 2021-08-21 RX ADMIN — SODIUM BICARBONATE TAB 325 MG SCH MG: 325 TAB at 11:43

## 2021-08-21 RX ADMIN — APIXABAN SCH MG: 2.5 TABLET, FILM COATED ORAL at 21:41

## 2021-08-21 RX ADMIN — DOCUSATE SODIUM SCH: 100 CAPSULE, LIQUID FILLED ORAL at 11:43

## 2021-08-21 RX ADMIN — SODIUM BICARBONATE TAB 325 MG SCH: 325 TAB at 12:00

## 2021-08-21 RX ADMIN — METOPROLOL SUCCINATE SCH MG: 100 TABLET, EXTENDED RELEASE ORAL at 06:28

## 2021-08-21 RX ADMIN — SODIUM CHLORIDE SCH: 0.45 INJECTION, SOLUTION INTRAVENOUS at 16:18

## 2021-08-21 RX ADMIN — CALCITRIOL CAPSULES 0.25 MCG SCH MCG: 0.25 CAPSULE ORAL at 11:43

## 2021-08-21 RX ADMIN — Medication SCH ML: at 09:00

## 2021-08-21 RX ADMIN — PRAZOSIN HYDROCHLORIDE SCH: 1 CAPSULE ORAL at 21:00

## 2021-08-21 NOTE — P.PN
Date of Service: 08/21/21


Vital Signs











Temp Pulse Resp BP Pulse Ox


 


 97.8 F   63   20   147/84 H  99 


 


 08/21/21 16:00  08/21/21 16:00  08/21/21 16:00  08/21/21 16:00  08/21/21 16:00





Medications





Acetaminophen (Acetaminophen 500 Mg Tab)  500 mg PO Q4HP PRN


   PRN Reason: TEMP > 100' F


Allopurinol (Allopurinol 100 Mg Tab)  100 mg PO DAILY [facility]


   Last Admin: 08/21/21 11:44 Dose:  100 mg


   Documented by: 


Apixaban (Apixaban 2.5 Mg Tablet)  2.5 mg PO BID [facility]


   Last Admin: 08/21/21 21:41 Dose:  2.5 mg


   Documented by: 


Calcitriol (Calcitrol 0.25 Mcg Cap)  0.5 mcg PO DAILY [facility]


   Last Admin: 08/21/21 11:43 Dose:  0.5 mcg


   Documented by: 


Cholecalciferol (Vitamin D 5,000 Unit Cap)  5,000 unit PO DAILY [facility]


   Last Admin: 08/21/21 11:44 Dose:  5,000 unit


   Documented by: 


Diphenoxylate HCl/Atropine (Diphenox/Atrop Sulf 1 Tab)  1 tab PO QIDP PRN


   PRN Reason: DIARRHEA


   Last Admin: 08/21/21 17:50 Dose:  1 tab


   Documented by: 


Docusate Sodium (Docusate Na 100 Mg Cap)  100 mg PO BID [facility]


   Last Admin: 08/21/21 21:00 Dose:  Not Given


   Documented by: 


Sodium Chloride (Sodium Chloride 0.45%)  1,000 mls @ 100 mls/hr IV .Q10H [facility]


   Last Admin: 08/21/21 16:18 Dose:  Not Given


   Documented by: 


Cefepime HCl (Maxipime 1 Gm/10 Ml Ivp)  10 mls @ 600 mls/hr IVP DAILY [facility]; 

Protocol


   Last Admin: 08/21/21 11:42 Dose:  10 mls


   Documented by: 


Metoprolol Succinate (Metoprolol Xl 100 Mg Tab)  100 mg PO [id number] [facility]


   Last Admin: 08/21/21 06:28 Dose:  100 mg


   Documented by: 


Ondansetron HCl (Ondansetron 4 Mg/2 Ml Vial)  4 mg IV Q6HP PRN


   PRN Reason: NAUSEA / VOMITING


   Last Admin: 08/21/21 17:27 Dose:  4 mg


   Documented by: 


Pantoprazole Sodium (Pantoprazole 40 Mg Inj)  40 mg IVP DAILY [facility]; Protocol


   Last Admin: 08/21/21 11:44 Dose:  40 mg


   Documented by: 


Prazosin HCl (Prazosin Hcl 1 Mg Cap)  2 mg PO BEDTIME [facility]


   Last Admin: 08/21/21 21:00 Dose:  Not Given


   Documented by: 


Sodium Bicarbonate (Sodium Bicarb 325 Mg Tab)  650 mg PO TIDWM [facility]


   Last Admin: 08/21/21 16:03 Dose:  650 mg


   Documented by: 


Sodium Chloride (Flush Normal Saline 10 Ml)  10 ml IV BID [facility]


   Last Admin: 08/21/21 21:43 Dose:  10 ml


   Documented by: 


Sodium Chloride (Sodium Chloride 0.9% 10ml Inj)  10 ml IV UD PRN


   PRN Reason: Diluant





Assessment/ Plan: 


Nephrology Progress Note





Reports nausea and diarrhea


No chest pain or dyspnea


No acute events overnight





Vitals, medications blood work and imaging reviewed in the chart


General: In no apparent distress, Oriented x3, Cooperative


HEENT: Atraumatic


Neck: Supple


Respiratory: Clear to auscultation bilaterally


Cardiovascular: No edema, Regular rate/rhythm


Gastrointestinal: Soft and benign, Non-distended


Musculoskeletal: No clubbing, No contractures


Integumentary: No rashes, No cyanosis


Neurological: Normal speech


Laboratory Data (last 24 hrs)





08/19/21 21:04: Sodium 144, Potassium 3.2 L, BUN 34 H, Creatinine 2.27 H, Gluc

ose 96, Magnesium 1.5 L, Total Bilirubin 0.3, AST 16, ALT 23, Alkaline 

Phosphatase 78, Lipase 261





Imagings Data: 


EXAM DESCRIPTION:  RAD - Chest Single View - 8/19/2021 9:10 pm


CLINICAL HISTORY:  ABDOMINAL DISTENTION


Chest pain.


COMPARISON:  Chest Single View dated 8/15/2021; Chest Single View dated 

7/12/2021; Chest Single View dated 7/11/2021; Chest Single View dated 7/9/2021


FINDINGS:  Portable technique limits examination quality.


The lungs are grossly clear. The heart is upper limit of normal in size. No 

displaced fractures.


IMPRESSION:  No acute intrathoracic process suspected.








EXAM DESCRIPTION:  CT - Abdomen   Pelvis Wo Contrast - 8/20/2021 6:53 am


CLINICAL HISTORY:  85 years   Male   diarrhea; Abdominal distention


COMPARISON:  None.


TECHNIQUE:  Contiguous axial images obtained through the abdomen and pelvis 

without IV contrast. The study was performed following oral contrast. 

Reformatted images obtained.


This exam was performed according to our department optimization program which 

includes automated exposure control, adjustment of the mA and/or kv according to

patient size and/or use of iterative reconstruction technique.


FINDINGS:  The study is slightly suboptimal from motion.


Probable minimal pleural effusions. Mild scarring/atelectasis in the lower 

lungs.


Moderate to large hiatal hernia.


The liver appears unremarkable.


The spleen and pancreas appear unremarkable.


No adrenal masses.


There is a right renal cyst measuring 4.7 cm. No hydronephrosis or ureteral 

calculi.


Changes from previous cholecystectomy.


Atherosclerotic calcifications. No aneurysmal dilatation of the aorta.


There is fluid in the colon consistent with a diarrheal state. The ingested oral

contrast is visualized into the ascending colon. No bowel obstruction. The 

appendix appears unremarkable.   Scattered colonic diverticula.


No significant free pelvic fluid.      There are changes from prostatectomy. 

There are surgical clips in the pelvis.


There are small fat-containing inguinal hernias greater on the left. There is 

some fluid in the left inguinal hernia.


Degenerative changes in the spine.


IMPRESSION:  1.   There is fluid in the colon consistent with a diarrheal state.


2.   No bowel obstruction.


3.   Moderate to large hiatal hernia.


4.   There are changes from prostatectomy.


5.   There is some fluid in a left inguinal hernia.


6.   Other findings as above.











EXAM DESCRIPTION:  US - Renal Ultrasound-Complete - 8/20/2021 3:55 am


CLINICAL HISTORY:  JAREK, metabolic acidosis


COMPARISON:  Abdomen   Pelvis Wo Contrast dated 8/19/2021


FINDINGS:  The right kidney measures 10.5 x 5.2 x 4.5 cm.  The left kidney 

measures 10.8 x 5.5 x 4.4 cm. Renal cortical thickness is normal. No 

hydronephrosis or suspicious renal mass. A 5 centimeter anterior mid right renal

cyst is present. A 1.5 centimeter cyst is present upper pole of the left kidney 

and 1.3 and 1.8 centimeter size cysts are present mid and lower left kidney.


No bladder wall thickening or mass. No intraluminal stone or mass.


Both kidneys show an increase in cortical echogenicity


IMPRESSION:  No hydronephrosis or suspicious renal mass.


Increased renal parenchymal echogenicity is present. This can be body habitus 

affect or medical renal disease.





Conclusions/Impression: 


JAREK likely due to hypovolemia


CKD IV in the setting of MM


-No NSAIDs


-Continue IVF 1/2NS





Hypokalemia


-Recommend liquid potassium





Acidosis


-Continue oral bicarb





Hypocalcemia


-Replete prn


-Continue vitamin D3 and calcitriol





Hypomagnesemia


-Replete IV mag prn





Moderate malnutrition


-Encourage nutrition





Anemia in chronic illness


-Monitor H&H





Diarrhea


-Follow up stool studies

## 2021-08-22 VITALS — DIASTOLIC BLOOD PRESSURE: 67 MMHG | SYSTOLIC BLOOD PRESSURE: 130 MMHG | TEMPERATURE: 97.6 F

## 2021-08-22 VITALS — OXYGEN SATURATION: 98 %

## 2021-08-22 LAB
ALBUMIN SERPL BCP-MCNC: 2.7 G/DL (ref 3.4–5)
ALP SERPL-CCNC: 70 U/L (ref 45–117)
ALT SERPL W P-5'-P-CCNC: 20 U/L (ref 12–78)
AST SERPL W P-5'-P-CCNC: 13 U/L (ref 15–37)
BUN BLD-MCNC: 22 MG/DL (ref 7–18)
GLUCOSE SERPLBLD-MCNC: 77 MG/DL (ref 74–106)
HCT VFR BLD CALC: 29.6 % (ref 39.6–49)
LYMPHOCYTES # SPEC AUTO: 1 K/UL (ref 0.7–4.9)
MAGNESIUM SERPL-MCNC: 1.6 MG/DL (ref 1.8–2.4)
MORPHOLOGY BLD-IMP: (no result)
PMV BLD: 10.6 FL (ref 7.6–11.3)
POTASSIUM SERPL-SCNC: 3.2 MMOL/L (ref 3.5–5.1)
RBC # BLD: 3.05 M/UL (ref 4.33–5.43)
URATE SERPL-MCNC: 6.3 MG/DL (ref 3.5–7.2)

## 2021-08-22 RX ADMIN — SODIUM CHLORIDE SCH MLS: 0.45 INJECTION, SOLUTION INTRAVENOUS at 04:20

## 2021-08-22 RX ADMIN — CHOLECALCIFEROL CAP 125 MCG (5000 UNIT) SCH UNIT: 125 CAP at 10:21

## 2021-08-22 RX ADMIN — DIPHENOXYLATE HYDROCHLORIDE AND ATROPINE SULFATE PRN TAB: 2.5; .025 TABLET ORAL at 00:51

## 2021-08-22 RX ADMIN — SODIUM CHLORIDE SCH MG: 0.9 INJECTION, SOLUTION INTRAVENOUS at 10:21

## 2021-08-22 RX ADMIN — SODIUM CHLORIDE SCH: 0.45 INJECTION, SOLUTION INTRAVENOUS at 13:02

## 2021-08-22 RX ADMIN — CALCITRIOL CAPSULES 0.25 MCG SCH MCG: 0.25 CAPSULE ORAL at 10:21

## 2021-08-22 RX ADMIN — LACTOBACILLUS TAB SCH TAB: TAB at 10:21

## 2021-08-22 RX ADMIN — SODIUM BICARBONATE TAB 325 MG SCH MG: 325 TAB at 12:59

## 2021-08-22 RX ADMIN — CALCIUM SCH MG: 500 TABLET ORAL at 12:59

## 2021-08-22 RX ADMIN — Medication SCH ML: at 10:22

## 2021-08-22 RX ADMIN — CEFEPIME SCH MLS: 1 INJECTION, POWDER, FOR SOLUTION INTRAMUSCULAR; INTRAVENOUS at 10:20

## 2021-08-22 RX ADMIN — CALCIUM SCH MG: 500 TABLET ORAL at 10:21

## 2021-08-22 RX ADMIN — SODIUM BICARBONATE TAB 325 MG SCH MG: 325 TAB at 16:40

## 2021-08-22 RX ADMIN — METOPROLOL SUCCINATE SCH MG: 100 TABLET, EXTENDED RELEASE ORAL at 05:16

## 2021-08-22 RX ADMIN — APIXABAN SCH MG: 2.5 TABLET, FILM COATED ORAL at 10:21

## 2021-08-22 RX ADMIN — DOCUSATE SODIUM SCH: 100 CAPSULE, LIQUID FILLED ORAL at 09:00

## 2021-08-22 RX ADMIN — LACTOBACILLUS TAB SCH TAB: TAB at 12:59

## 2021-08-22 RX ADMIN — SODIUM BICARBONATE TAB 325 MG SCH MG: 325 TAB at 10:20

## 2021-08-22 NOTE — P.DS
Discharge Date: 08/22/21


Disposition: ROUTINE DISCHARGE


Discharge Condition: GOOD


Reason for Admission: Dehydration, diarrhea, JAREK


Brief History of Present Illness: 





85-year-old  male with history of multiple myeloma, COPD, hypertension, 

prostate cancer, CKD 4, GERD, hyperlipidemia presents the emergency department 

for diarrhea, generalized weakness.  Patient had outpatient labs done that 

demonstrated a low bicarb level and was referred to the emergency department for

further evaluation.  Patient was evaluated emergency room labs are significant 

for white blood cell count 3.7 hemoglobin 10.7 potassium 3.2 chloride 119 carbon

dioxide 15 BUN 34 creatinine 2.27 GFR 28.  Patient's baseline GFR appears to be 

around 30.  Patient reports ongoing diarrhea over the course of last 1 week, 

watery brown stool, denies any abdominal pain.  Currently C. difficile, stool 

cultures, O&P pending, CT abdomen pelvis with oral contrast pending as well.  ED

provider wishes to admit for further evaluation and management.





Hospital Course: 





Patient is clinically doing well with no new complaints.  Patient clinical 

symptoms are much better.  Patient is wanting to go home today.  Patient's renal

function is back to baseline in his electrolytes are improved.  Calcium was low 

so I will go ahead and replenish.  Continue with oral supplementation at 

discharge. At this time, patient is stable for discharge home.


Vital Signs/Physical Exam: 














Temp Pulse Resp BP Pulse Ox


 


 98.3 F   65   18   136/79   98 


 


 08/22/21 12:00  08/22/21 12:00  08/22/21 12:00  08/22/21 12:00  08/22/21 12:00








General: Alert, In no apparent distress, Oriented x3


Laboratory Data at Discharge: 














WBC  4.30 K/uL (4.3-10.9)   08/22/21  05:45    


 


Hgb  10.0 g/dL (13.6-17.9)  L  08/22/21  05:45    


 


Hct  29.6 % (39.6-49.0)  L  08/22/21  05:45    


 


Plt Count  71 K/uL (152-406)  L  08/22/21  05:45    


 


Sodium  145 mmol/L (136-145)   08/22/21  05:45    


 


Potassium  3.2 mmol/L (3.5-5.1)  L  08/22/21  05:45    


 


BUN  22 mg/dL (7-18)  H  08/22/21  05:45    


 


Creatinine  1.86 mg/dL (0.55-1.3)  H  08/22/21  05:45    


 


Glucose  77 mg/dL ()   08/22/21  05:45    


 


Uric Acid  6.3 mg/dL (3.5-7.2)   08/22/21  05:45    


 


Phosphorus  2.9 mg/dL (2.5-4.9)   08/22/21  05:45    


 


Magnesium  1.6 mg/dL (1.8-2.4)  L  08/22/21  05:45    


 


Total Bilirubin  0.5 mg/dL (0.2-1.0)   08/22/21  05:45    


 


AST  13 U/L (15-37)  L  08/22/21  05:45    


 


ALT  20 U/L (12-78)   08/22/21  05:45    


 


Alkaline Phosphatase  70 U/L ()   08/22/21  05:45    


 


Triglycerides  156 mg/dL (<150)  H  08/20/21  03:25    


 


Cholesterol  105 mg/dL (<200)   08/20/21  03:25    


 


HDL Cholesterol  35 mg/dL (40-60)  L  08/20/21  03:25    


 


Cholesterol/HDL Ratio  3.00   08/20/21  03:25    


 


Lipase  261 U/L ()   08/19/21  21:04    








Home Medications: 








RX: Allopurinol 1 tab PO DAILY 07/10/21 


RX: Budesonide/Formoterol Fumarate [Budesonide-Formoterol 160-4.5] 1 puff IH BID

07/10/21 


RX: Lenalidomide [Revlimid] 1 cap PO DAILY 07/10/21 


RX: Omeprazole 1 cap PO DAILY 07/10/21 


RX: Prazosin HCl 1 cap PO BEDTIME 07/10/21 


RX: Valacyclovir [Valtrex*] 1 tab PO DAILY 07/10/21 


RX: dexAMETHasone [Dexamethasone] 5 tab PO SEECOM 07/10/21 


RX: Apixaban [Eliquis *] 2.5 mg PO BID #60 tablet 07/13/21 


RX: Furosemide [Lasix*] 40 mg PO DAILY #30 tab 07/13/21 


RX: Metoprolol Succinate [Toprol Xl*] 100 mg PO DAILY #30 tab 07/13/21 


RX: Na Bicarb Tab [Sodium Bicarb 325 MG Tab*] 650 mg PO QID #120 tab 07/13/21 


RX: Diphenox/Atropine [Lomotil*] 1 tab PO TIDP PRN 08/21/21 


Magnesium Chloride [Slow-Mag] 64 mg PO DAILY #30 tab 08/22/21 


Potassium Chloride [K-Dur] 20 meq PO DAILY #30 tab.er.prt 08/22/21 


RX: Calcitrol [Rocaltrol*] 0.5 mcg PO DAILY #30 cap 08/22/21 


RX: Calcium Carbonate [Oscal*] 500 mg PO TID #90 tab 08/22/21 


RX: Cholecalciferol (Vitamin D3) [Vitamin D 5,000 IU Cap*] 5,000 unit PO DAILY 

#30 cap 08/22/21 


RX: allopurinoL [Zyloprim*] 100 mg PO DAILY #30 tab 08/22/21 





New Medications: 


Potassium Chloride [K-Dur] 20 meq PO DAILY #30 tab.er.prt


RX: Calcium Carbonate [Oscal*] 500 mg PO TID #90 tab


RX: Calcitrol [Rocaltrol*] 0.5 mcg PO DAILY #30 cap


Magnesium Chloride [Slow-Mag] 64 mg PO DAILY #30 tab


RX: Cholecalciferol (Vitamin D3) [Vitamin D 5,000 IU Cap*] 5,000 unit PO DAILY 

#30 cap


RX: allopurinoL [Zyloprim*] 100 mg PO DAILY #30 tab


Physician Discharge Instructions: 


OK TO DC IV AND DC HOME


FOLLOW-UP WITH PRIMARY CARE PROVIDER IN 1-2 WEEKS


FOLLOW-UP WITH NEPHROLOGY AND ONCOLOGY IN 1-2 WEEKS


RETURN TO THE ER IF SYMPTOMS WORSEN


CALL or TEXT DR. LAMAR -[phone] IF ANY QUESTIONS REGARDING HOSPITAL STAY.


PLEASE CALL THE FLOOR -[phone] IF ANY MEDICATION OR NURSING QUESTIONS.


Diet: AHA


Activity: Fall precautions


Followup: 


Suhail Davies DO [ACTIVE - CAN ADMIT] - 


NONE,NONE [Primary Care Provider] - 


Time spent managing pt's care (in minutes): 35

## 2021-08-22 NOTE — P.PN
Subjective


Date of Service: 08/22/21


Chief Complaint: Dehydration, diarrhea, JAREK


Subjective: Doing well (No overnight events.)





<Bekah De Luna - Last Filed: 08/22/21 10:35>


Date of Service: 08/22/21





<Juan Carlos Limon - Last Filed: 08/22/21 15:26>





Physical Examination





- Vital Signs


Temperature: 97.3 F


Blood Pressure: 142/78


Pulse: 70


Respirations: 19


Pulse Ox (%): 99





- Physical Exam


General: Alert, In no apparent distress, Oriented x3


HEENT: Atraumatic, Normocephalic, PERRLA, Sclerae nonicteric


Neck: Supple, Without JVD or thyroid abnormality


Respiratory: Clear to auscultation bilaterally, Normal air movement


Cardiovascular: No edema, Normal pulses, Regular rate/rhythm


Gastrointestinal: Normal bowel sounds, No ascites, No tenderness, No masses, No 

rebound, No guarding


Musculoskeletal: No clubbing, No swelling, No erythema, No tenderness, No warmth


Integumentary: No rashes, No ulcers


Neurological: Normal gait, Normal speech, Normal strength at 5/5 x4 extr





<Bekah De Luna - Last Filed: 08/22/21 10:35>





Assessment And Plan


Discharge Plan: Home


Plan to discharge in: 72 Hours


Physician Review: Patient Assessed, Agree with Above Assessment and Plan


Physician Review Additional Text: 





- Plan


JAREK on CKD stage 4:


His creatinine is improved at 1.86 today.


we will continue IV fluid and monitoring of renal labs.


we will avoid exposure to nephrotoxins.





Diarrhea:


Improving but still an issues. awaiting C diff labs.


We will continue empiric antibiotics.





COPD:


we will continue outpatient inhalation therapy.





Chronic A fib:


we will continue rate control meds and anticoagulant therapy.





Multiple myeloma:


we will continue revlimid.





Hypokalemia:


Potassium improved 3.2. Continue to monitor and replete per protocol. 





Hypomagnesemia:


magnesium is low at 1.6. we will replete and monitor.





Hypernatremia: Improved. likely 2/2 dehydration. Continue IV fluids and monitor.







UTI: E. Coli identified on urine culture. Continue IV abx. continue to monitor 

for now 





Hypocalcemia:


Calcium is low at 6.1 but corrected for hypoalbuminemia, its 7.4. 


we will dose 2g of calcium gluconate one time dose and continue routine 

monitoring. 








<Bekah De Luna - Last Filed: 08/22/21 10:35>


Date of Service: 08/22/21


Subjective:


PATIENT CLINICALLY DOING WELL.  CORRECTING ELECTROLYTES AND ADVANCING DIET AND 

POSSIBLE DISCHARGE OVER THE NEXT 24 HR





Physical Examination:


Vitals:  Afebrile vital signs are stable


Physical exam:


Cardiovascular:  Within normal limits.


Lungs:  Within normal limits


Abdomen:  Within normal limits


Neuro:  Awake, alert, oriented to person place and time





Assessment:


1.ACUTE KIDNEY INJURY


2. ELECTROLYTE ABNORMALITIES





Plan:


1. CONTINUE GENTLE HYDRATION


2. CORRECT ELECTROLYTES


3. ADVANCED DIET


4. GI AND DVT PROPHYLAXIS





Continue with current plan of care





   





<Juan Carlos Limon - Last Filed: 08/22/21 15:26>

## 2021-11-10 ENCOUNTER — HOSPITAL ENCOUNTER (EMERGENCY)
Dept: HOSPITAL 97 - ER | Age: 86
LOS: 1 days | Discharge: HOME | End: 2021-11-11
Payer: COMMERCIAL

## 2021-11-10 DIAGNOSIS — I50.9: ICD-10-CM

## 2021-11-10 DIAGNOSIS — I48.20: ICD-10-CM

## 2021-11-10 DIAGNOSIS — I13.0: Primary | ICD-10-CM

## 2021-11-10 DIAGNOSIS — N18.9: ICD-10-CM

## 2021-11-10 LAB
ALBUMIN SERPL BCP-MCNC: 3.3 G/DL (ref 3.4–5)
ALP SERPL-CCNC: 88 U/L (ref 45–117)
ALT SERPL W P-5'-P-CCNC: 17 U/L (ref 12–78)
AST SERPL W P-5'-P-CCNC: 18 U/L (ref 15–37)
BUN BLD-MCNC: 25 MG/DL (ref 7–18)
GLUCOSE SERPLBLD-MCNC: 89 MG/DL (ref 74–106)
HCT VFR BLD CALC: 37.7 % (ref 39.6–49)
INR BLD: 1.32
LYMPHOCYTES # SPEC AUTO: 1 K/UL (ref 0.7–4.9)
MAGNESIUM SERPL-MCNC: 2.1 MG/DL (ref 1.8–2.4)
PMV BLD: 8.7 FL (ref 7.6–11.3)
POTASSIUM SERPL-SCNC: 4.1 MMOL/L (ref 3.5–5.1)
RBC # BLD: 3.69 M/UL (ref 4.33–5.43)
TROPONIN (EMERG DEPT USE ONLY): 0.02 NG/ML (ref 0–0.04)

## 2021-11-10 PROCEDURE — 93005 ELECTROCARDIOGRAM TRACING: CPT

## 2021-11-10 PROCEDURE — 85025 COMPLETE CBC W/AUTO DIFF WBC: CPT

## 2021-11-10 PROCEDURE — 81015 MICROSCOPIC EXAM OF URINE: CPT

## 2021-11-10 PROCEDURE — 81003 URINALYSIS AUTO W/O SCOPE: CPT

## 2021-11-10 PROCEDURE — 96374 THER/PROPH/DIAG INJ IV PUSH: CPT

## 2021-11-10 PROCEDURE — 85610 PROTHROMBIN TIME: CPT

## 2021-11-10 PROCEDURE — 83735 ASSAY OF MAGNESIUM: CPT

## 2021-11-10 PROCEDURE — 80076 HEPATIC FUNCTION PANEL: CPT

## 2021-11-10 PROCEDURE — 80048 BASIC METABOLIC PNL TOTAL CA: CPT

## 2021-11-10 PROCEDURE — 84484 ASSAY OF TROPONIN QUANT: CPT

## 2021-11-10 PROCEDURE — 83880 ASSAY OF NATRIURETIC PEPTIDE: CPT

## 2021-11-10 PROCEDURE — 36415 COLL VENOUS BLD VENIPUNCTURE: CPT

## 2021-11-10 PROCEDURE — 99284 EMERGENCY DEPT VISIT MOD MDM: CPT

## 2021-11-10 PROCEDURE — 70450 CT HEAD/BRAIN W/O DYE: CPT

## 2021-11-10 NOTE — ER
Nurse's Notes                                                                                     

 [facility]                                                                 

Name: Zofia Rudd Jr                                                                              

Age: 85 yrs                                                                                       

Sex: Male                                                                                         

: 1935                                                                                   

MRN: [id number]                                                                                   

Arrival Date: 11/10/2021                                                                          

Time: 21:12                                                                                       

Account#: [id number]                                                                            

Bed 15                                                                                            

Private MD:                                                                                       

Diagnosis: Hypertensive heart and chronic kidney disease with heart failure and stage 1 through   

  stage 4 chronic kidney disease, or unspecified chronic kidney disease;Chronic atrial            

  fibrillation                                                                                    

                                                                                                  

Presentation:                                                                                     

11/10                                                                                             

21:32 Chief complaint: Patient states: I was at home this evening and I took my blood         ld1 

      pressure several times, the reading was high in the 200s/100 so I was worried and my        

      daughter who is a nurse told me to come to the ER. Coronavirus screen: At this time,        

      the client does not indicate any symptoms associated with coronavirus-19. Ebola Screen:     

      No symptoms or risks identified at this time. Initial Sepsis Screen: Does the patient       

      meet any 2 criteria? No. Patient's initial sepsis screen is negative. Does the patient      

      have a suspected source of infection? No. Patient's initial sepsis screen is negative.      

      Risk Assessment: Do you want to hurt yourself or someone else? Patient reports no           

      desire to harm self or others. Onset of symptoms was November 10, 2021.                     

21:32 Method Of Arrival: Ambulatory                                                           ld1 

21:32 Acuity: SHARA 3                                                                           ld1 

                                                                                                  

Triage Assessment:                                                                                

21:36 General: Appears in no apparent distress. comfortable, Behavior is calm, cooperative,   ld1 

      appropriate for age. Pain: Denies pain. EENT: No signs and/or symptoms were reported        

      regarding the EENT system. Neuro: Level of Consciousness is awake, alert, obeys             

      commands, Oriented to person, place, time, situation, Appropriate for age.                  

      Cardiovascular: Capillary refill < 3 seconds Patient's skin is warm and dry.                

      Cardiovascular: Rhythm is irregular. Respiratory: Airway is patent Respiratory effort       

      is even, unlabored, Respiratory pattern is regular, symmetrical. GI: Abdomen is round       

      non-distended. : No signs and/or symptoms were reported regarding the genitourinary       

      system. Derm: No signs and/or symptoms reported regarding the dermatologic system.          

      Musculoskeletal: No signs and/or symptoms reported regarding the musculoskeletal system.    

                                                                                                  

Historical:                                                                                       

- Home Meds:                                                                                      

21:36 amlodipine oral [Active]; dexamethasone 16 mg Oral tab On chemo days [Active]; Eliquis  ld1 

      2.5 mg Oral tab 1 tab 2 times per day [Active]; Hydrochlorothiazide Oral [Active];          

      Lasix 20 mg Oral tab 1 tab once daily [Active]; losartan Oral [Active]; metoprolol          

      tartrate 50 mg oral tab 1 tab once daily [Active]; valacyclovir Oral [Active];              

      Omeprazole Oral [Active]; Revlimid Oral [Active]; Zofran 4 mg Oral tab 1 tab [Active];      

      amiodarone 100 mg Oral tab 1 tab once daily [Active]; metoprolol tartrate 50 mg Oral        

      tab 1 tab once daily [Active];                                                              

- PMHx:                                                                                           

21:36 a-fib; chemotherapy; CKD; GERD; Hypertension; multiple myeloma;                         ld1 

- PSHx:                                                                                           

21:36 cataract; Prostectomy; Cholecystectomy;                                                 ld1 

                                                                                                  

- Immunization history:: Adult Immunizations up to date, Client reports receiving the             

  2nd dose of the Covid vaccine.                                                                  

- Social history:: Smoking status: Patient denies any tobacco usage or history of.                

  Patient/guardian denies using alcohol.                                                          

                                                                                                  

                                                                                                  

Screenin:42 Abuse screen: Denies threats or abuse. Denies injuries from another. Nutritional        ld1 

      screening: No deficits noted. Tuberculosis screening: No symptoms or risk factors           

      identified. Fall Risk None identified.                                                      

                                                                                                  

Assessment:                                                                                       

21:42 Reassessment: See triage assessment.                                                    ld1 

22:58 Reassessment: Patient appears in no apparent distress at this time. Patient and/or      ld1 

      family updated on plan of care and expected duration. Pain level reassessed. Patient is     

      alert, oriented x 3, equal unlabored respirations, skin warm/dry/pink.                      

                                                                                                  

Vital Signs:                                                                                      

21:32  / 107; Pulse 66; Resp 20; Temp 98.3(O); Pulse Ox 97% on R/A; Weight 97.07 kg;    ld1 

      Height 5 ft. 8 in. (172.72 cm); Pain 0/10;                                                  

21:42  / 93; Pulse 76; Resp 18; Pulse Ox 98% on R/A;                                    ld1 

22:58  / 91; Pulse 68; Resp 17; Pulse Ox 99% on R/A;                                    ld1 

21:32 Body Mass Index 32.54 (97.07 kg, 172.72 cm)                                             ld1 

                                                                                                  

ED Course:                                                                                        

21:12 Patient arrived in ED.                                                                  cf2 

21:16 Clint Orozco PA is PHCP.                                                                cp  

21:16 Vikash Calabrese MD is Attending Physician.                                                cp  

21:32 Marcy Palafox, RN is Primary Nurse.                                                   ld1 

21:36 Triage completed.                                                                       ld1 

21:36 Arm band placed on right wrist. EKG completed in triage. Results shown to MD.           ld1 

21:42 Patient has correct armband on for positive identification. Placed in gown. Bed in low  ld1 

      position. Call light in reach. Side rails up X2. Cardiac monitor on. Pulse ox on. NIBP      

      on. Door closed. Noise minimized. Warm blanket given.                                       

21:42 No provider procedures requiring assistance completed. Inserted saline lock: 20 gauge   ld1 

      in right antecubital area, using aseptic technique. Blood collected.                        

21:57 Inserted saline lock: 20 gauge in left antecubital area, using aseptic technique. Blood dh4 

      collected.                                                                                  

22:07 Urine Microscopic Only Sent.                                                            ld1 

22:12 CT Head Brain wo Cont In Process Unspecified.                                           EDMS

23:43 Toni Sherwood MD is Referral Physician.                                               cp  

                                                                                             

01:18 Basic Metabolic Panel Sent.                                                             ja3 

01:18 CBC with Diff Sent.                                                                     ja3 

01:18 IV discontinued.                                                                        ja3 

                                                                                                  

Administered Medications:                                                                         

11/10                                                                                             

22:32 Drug: hydrALAZINE 10 mg Route: IVP; Site: left antecubital;                             ld1 

22:32 Follow up: Response: No adverse reaction                                                ld1 

                                                                                             

01:17 Follow up: Response: No adverse reaction                                                ja3 

01:17 Not Given (Patient Refused): Lasix (furosemide) 20 mg IVP once; give over 2 minutes     ja3 

                                                                                                  

                                                                                                  

Outcome:                                                                                          

11/10                                                                                             

23:45 Discharge ordered by MD.                                                                cp  

                                                                                             

01:18 Discharged to home ambulatory, with significant other.                                  ja3 

      Condition: good                                                                             

      Discharge instructions given to patient, family, Instructed on discharge instructions,      

      follow up and referral plans. Demonstrated understanding of instructions, follow-up         

      care.                                                                                       

01:22 Patient left the ED.                                                                    ja3 

                                                                                                  

Signatures:                                                                                       

Dispatcher [employer]                           EDMS                                                 

Clint Orozco PA                         PA   cp                                                   

Kinga Li                             cf2                                                  

Lucho Carlos                                 dh4                                                  

Marcy Palafox, RN                     RN   ld1                                                  

Samra Hart RN              RN   ja3                                                  

                                                                                                  

**************************************************************************************************

## 2021-11-10 NOTE — EDPHYS
Physician Documentation                                                                           

 [facility]                                                                 

Name: Zofia Rudd Jr                                                                              

Age: 85 yrs                                                                                       

Sex: Male                                                                                         

: 1935                                                                                   

MRN: [id number]                                                                                   

Arrival Date: 11/10/2021                                                                          

Time: 21:12                                                                                       

Account#: [id number]                                                                            

Bed 15                                                                                            

Private MD:                                                                                       

[facility] Physician Vikash Calabrese                                                                         

HPI:                                                                                              

11/10                                                                                             

21:45 This 85 yrs old  Male presents to ER via Ambulatory with complaints of High     cp  

      Blood Pressure.                                                                             

21:45 The patient has elevated blood pressure and discovered this at home, with a home device.cp  

21:45 Onset: The symptoms/episode began/occurred gradually. Associated signs and symptoms:    cp  

      Pertinent negatives: chest pain, dizziness, headache, lightheadedness, vomiting,            

      weakness. Severity of symptoms: At its worst the blood pressure was 200 mm Hg. Daughter     

      reports patient has history of HTN, a-fib, kidney failure and recently finished chemo       

      for multiple myeloma. Daughter reports patient was taken off prescribed blood pressure      

      medications due to a-fib and started on Amiodarone. Blood pressure has been elevated        

      over past couple weeks with a systolic pressure of 200 tonight. Patient has been            

      restarted on 1/2 tablet in morning of metoprolol 100 mg.                                    

                                                                                                  

Historical:                                                                                       

- Home Meds:                                                                                      

21:36 amlodipine oral [Active]; dexamethasone 16 mg Oral tab On chemo days [Active]; Eliquis  ld1 

      2.5 mg Oral tab 1 tab 2 times per day [Active]; Hydrochlorothiazide Oral [Active];          

      Lasix 20 mg Oral tab 1 tab once daily [Active]; losartan Oral [Active]; metoprolol          

      tartrate 50 mg oral tab 1 tab once daily [Active]; valacyclovir Oral [Active];              

      Omeprazole Oral [Active]; Revlimid Oral [Active]; Zofran 4 mg Oral tab 1 tab [Active];      

      amiodarone 100 mg Oral tab 1 tab once daily [Active]; metoprolol tartrate 50 mg Oral        

      tab 1 tab once daily [Active];                                                              

- PMHx:                                                                                           

21:36 a-fib; chemotherapy; CKD; GERD; Hypertension; multiple myeloma;                         ld1 

- PSHx:                                                                                           

21:36 cataract; Prostectomy; Cholecystectomy;                                                 ld1 

                                                                                                  

- Immunization history:: Adult Immunizations up to date, Client reports receiving the             

  2nd dose of the Covid vaccine.                                                                  

- Social history:: Smoking status: Patient denies any tobacco usage or history of.                

  Patient/guardian denies using alcohol.                                                          

                                                                                                  

                                                                                                  

ROS:                                                                                              

21:50 Constitutional: Negative for body aches, chills, fever, poor PO intake.                 cp  

21:50 Eyes: Negative for injury, pain, redness, and discharge.                                cp  

21:50 ENT: Negative for ear pain, sore throat, difficulty swallowing, difficulty handling         

      secretions.                                                                                 

21:50 Cardiovascular: Negative for chest pain, edema, palpitations.                               

21:50 Respiratory: Negative for cough, shortness of breath, wheezing.                             

21:50 Abdomen/GI: Negative for abdominal pain, nausea, vomiting, and diarrhea.                    

21:50 Neuro: Negative for altered mental status, dizziness, headache, numbness, syncope,          

      weakness.                                                                                   

21:50 All other systems are negative.                                                             

                                                                                                  

Exam:                                                                                             

21:25 ECG was reviewed by the Attending Physician.                                            cp  

21:55 Constitutional: The patient appears in no acute distress, alert, awake, comfortable,    cp  

      non-diaphoretic, non-toxic, well developed, well nourished.                                 

21:55 Head/Face:  Normocephalic, atraumatic.                                                  cp  

21:55 Eyes: Periorbital structures: appear normal, Pupils: equal, round, and reactive to          

      light and accomodation, Extraocular movements: intact throughout, Conjunctiva: normal,      

      no exudate, no injection, Sclera: no appreciated abnormality, Lids and lashes: appear       

      normal, bilaterally.                                                                        

21:55 ENT: External ear(s): are unremarkable, Nose: is normal, Mouth: Lips: moist, Oral           

      mucosa: moist, Posterior pharynx: Airway: no evidence of obstruction, patent.               

21:55 Neck: ROM/movement: is normal, is supple, without pain, no range of motions                 

      limitations, no meningismus.                                                                

21:55 Chest/axilla: Inspection: normal, Palpation: is normal, no crepitus, no tenderness.         

21:55 Cardiovascular: Rate: normal, Rhythm: irregular, JVD: is not appreciated.               cp  

21:55 Respiratory: the patient does not display signs of respiratory distress,  Respirations: cp  

      normal, no use of accessory muscles, no retractions, labored breathing, is not present,     

      Breath sounds: decreased breath sounds, are not appreciated, stridor, is not                

      appreciated, wheezing: is not appreciated.                                                  

21:55 Abdomen/GI: Inspection: abdomen appears normal, Bowel sounds: active, all quadrants,        

      Palpation: abdomen is soft and non-tender, in all quadrants.                                

21:55 Back: pain, is absent, ROM is normal.                                                       

21:55 Neuro: Orientation: to person, place \T\ time. Mentation: is normal, Motor: moves all       

      fours, strength is normal, Sensation: is normal.                                            

                                                                                                  

Vital Signs:                                                                                      

21:32  / 107; Pulse 66; Resp 20; Temp 98.3(O); Pulse Ox 97% on R/A; Weight 97.07 kg;    ld1 

      Height 5 ft. 8 in. (172.72 cm); Pain 0/10;                                                  

21:42  / 93; Pulse 76; Resp 18; Pulse Ox 98% on R/A;                                    ld1 

22:58  / 91; Pulse 68; Resp 17; Pulse Ox 99% on R/A;                                    ld1 

21:32 Body Mass Index 32.54 (97.07 kg, 172.72 cm)                                             ld1 

                                                                                                  

MDM:                                                                                              

21:23 Patient medically screened.                                                             cp  

22:00 Differential diagnosis: hypertensive crisis, Malignant HTN, CVA, intracerebral          cp  

      hemorrhage, chronic kidney failure, electrolyte abnormality.                                

23:45 Data reviewed: vital signs, nurses notes, lab test result(s), EKG, radiologic studies,  cp  

      CT scan.                                                                                    

23:45 Test interpretation: by ED physician or midlevel provider: ECG. Counseling: I had a     cp  

      detailed discussion with the patient and/or guardian regarding: the historical points,      

      exam findings, and any diagnostic results supporting the discharge/admit diagnosis, lab     

      results, radiology results, the need for outpatient follow up, a cardiologist, to           

      return to the emergency department if symptoms worsen or persist or if there are any        

      questions or concerns that arise at home. Response to treatment: the patient's symptoms     

      have markedly improved after treatment, and as a result, I will discharge patient.          

                                                                                                  

11/10                                                                                             

21:36 Order name: Basic Metabolic Panel                                                         

11/10                                                                                             

21:36 Order name: CBC with Diff                                                               cp  

11/10                                                                                             

21:36 Order name: LFT's; Complete Time: 23:26                                                 cp  

11/10                                                                                             

23:26 Interpretation: Normal except: ALB 3.3; GLOB 3.9; A/G 0.8.                                

11/10                                                                                             

21:36 Order name: Magnesium; Complete Time: 23:26                                               

11/10                                                                                             

23:34 Interpretation: MG 2.1; Reviewed.                                                         

11/10                                                                                             

21:36 Order name: NT PRO-BNP; Complete Time: 23:26                                              

11/10                                                                                             

21:36 Order name: PT-INR; Complete Time: 22:43                                                  

11/10                                                                                             

21:36 Order name: Troponin (emerg Dept Use Only); Complete Time: 23:26                          

11/10                                                                                             

21:37 Order name: Basic Metabolic Panel; Complete Time: 23:26                                 EDMS

11/10                                                                                             

23:26 Interpretation: Normal except: ; BUN 25; CRE 2.07; GFR 31.                          

11/10                                                                                             

21:37 Order name: CBC with Automated Diff; Complete Time: 22:43                               EDMS

11/10                                                                                             

22:44 Interpretation: Normal except: RBC 3.69; HGB 12.3; HCT 37.7; .2; ; RDW    cp  

      19.3.                                                                                       

11/10                                                                                             

21:37 Order name: Urine Microscopic Only; Complete Time: 23:26                                  

11/10                                                                                             

21:52 Order name: CT Head Brain wo Cont; Complete Time: 22:43                                   

11/10                                                                                             

22:07 Order name: Urine Dipstick-Ancillary; Complete Time: 22:21                              EDMS

11/10                                                                                             

21:36 Order name: EKG; Complete Time: 21:37                                                     

11/10                                                                                             

21:36 Order name: Cardiac monitoring; Complete Time: 21:43                                      

11/10                                                                                             

21:36 Order name: EKG - Nurse/Tech; Complete Time: 21:43                                        

11/10                                                                                             

21:36 Order name: IV Saline Lock; Complete Time: 21:43                                          

11/10                                                                                             

21:36 Order name: Labs collected and sent; Complete Time: 21:43                                 

11/10                                                                                             

21:36 Order name: O2 Per Protocol; Complete Time: 21:43                                         

11/10                                                                                             

21:36 Order name: O2 Sat Monitoring; Complete Time: 21:43                                       

11/10                                                                                             

21:37 Order name: Urine Dipstick-Ancillary (obtain specimen); Complete Time: 22:07            cp  

                                                                                                  

EC:25 Rate is 91 beats/min. Rhythm is irregular. QRS interval is normal. QT interval is       cp  

      normal. T waves are Inverted in leads aVL, aVR. Interpreted by me. Reviewed by me.          

                                                                                                  

Administered Medications:                                                                         

22:32 Drug: hydrALAZINE 10 mg Route: IVP; Site: left antecubital;                             ld1 

22:32 Follow up: Response: No adverse reaction                                                ld1 

                                                                                             

01:17 Follow up: Response: No adverse reaction                                                ja3 

01:17 Not Given (Patient Refused): Lasix (furosemide) 20 mg IVP once; give over 2 minutes     ja3 

                                                                                                  

                                                                                                  

Disposition:                                                                                      

05:47 Co-signature as Attending Physician, Vikash Calabrese MD I agree with the assessment and     sp3 

      plan of care.                                                                               

                                                                                                  

Disposition Summary:                                                                              

11/10/21 23:45                                                                                    

Discharge Ordered                                                                                 

      Location: Home                                                                          cp  

      Problem: an ongoing problem                                                             cp  

      Symptoms: have improved                                                                 cp  

      Condition: Stable                                                                       cp  

      Diagnosis                                                                                   

        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  

      through stage 4 chronic kidney disease, or unspecified chronic kidney disease               

        - Chronic atrial fibrillation                                                         cp  

      Followup:                                                                               cp  

        - With: Toni Sherwood MD                                                                 

        - When: 2 - 3 days                                                                         

        - Reason: Recheck today's complaints                                                       

      Discharge Instructions:                                                                     

        - Discharge Summary Sheet                                                             cp  

        - Atrial Fibrillation                                                                 cp  

        - Chronic Kidney Disease, Adult                                                       cp  

        - Heart Failure, Diagnosis                                                            cp  

        - Hypertension, Adult                                                                 cp  

        - Managing Your Hypertension                                                          cp  

      Forms:                                                                                      

        - Medication Reconciliation Form                                                      cp  

        - Thank You Letter                                                                    cp  

        - Antibiotic Education                                                                cp  

        - Prescription Opioid Use                                                             cp  

Signatures:                                                                                       

Dispatcher [employer]                           EDMS                                                 

Clint Orozco PA PA   cp                                                   

Marcy Palafox, RN                     RN   ld1                                                  

Vikash Calabrese MD MD   sp3                                                  

Samra Hart RN   ja3                                                  

                                                                                                  

**************************************************************************************************

## 2021-11-10 NOTE — RAD REPORT
EXAM DESCRIPTION:  CT - Head Brain Wo Cont - 11/10/2021 10:12 pm

 

CLINICAL HISTORY:  elevated blood pressure

Headache, hypertension

 

COMPARISON:  No comparisons

 

TECHNIQUE:  All CT scans are performed using dose optimization technique as appropriate and may inclu
de automated exposure control or mA/KV adjustment according to patient size.

 

FINDINGS:  No intracranial hemorrhage, hydrocephalus or extra-axial fluid collection.No areas of brai
n edema or evidence of midline shift.

 

The paranasal sinuses and mastoids are clear. The calvarium is intact.

 

IMPRESSION:  No acute intracranial abnormality.

## 2021-11-11 VITALS — DIASTOLIC BLOOD PRESSURE: 91 MMHG | SYSTOLIC BLOOD PRESSURE: 151 MMHG | OXYGEN SATURATION: 99 %

## 2021-11-11 VITALS — TEMPERATURE: 98.3 F

## 2021-11-12 NOTE — EKG
Test Date:    2021-11-10               Test Time:    21:20:00

Technician:   XI                                   

                                                     

MEASUREMENT RESULTS:                                       

Intervals:                                           

Rate:         91                                     

WI:                                                  

QRSD:         88                                     

QT:           384                                    

QTc:          472                                    

Axis:                                                

P:                                                   

WI:                                                  

QRS:          -47                                    

T:            80                                     

                                                     

INTERPRETIVE STATEMENTS:                                       

                                                     

Atrial fibrillation

Left anterior fascicular block

Abnormal ECG

Compared to ECG 08/15/2021 05:21:19

Left anterior fascicular block now present

ST (T wave) deviation no longer present

Prolonged QT interval no longer present



Electronically Signed On 11-12-21 20:35:37 CST by Toni Sherwood

## 2021-11-13 NOTE — XMS REPORT
Continuity of Care Document

                          Created on:2021



Patient:JULIO HICKEY

Sex:Male

:1935

External Reference #:[id number]





Demographics







                          Address                   [address] [place] [address]

 

                          Home Phone                [phone]

 

                          Mobile Phone              [phone]

 

                          Email Address             [email or web address]

 

                          Preferred Language        English

 

                          Marital Status            Unknown

 

                          Jewish Affiliation     Unknown

 

                          Race                      Unknown

 

                          Additional Race(s)        Unavailable



                                                    Unavailable



                                                    White

 

                          Ethnic Group              Unknown









Author







                          Organization              [facility]

t

 

                          Address                   [address]



                                                    [place] [address]

 

                          Phone                     [phone]









Support







                Name            Relationship    Address         Phone

 

                Niecy Hickey    Employer        Unavailable     [phone]









Care Team Providers







                    Name                Role                Phone

 

                    AALIYAH WILBURN          Attending Clinician Unavailable

 

                    MD AALIYAH WILBURN       Attending Clinician Unavailable

 

                    Only, Adc Test      Attending Clinician Unavailable

 

                    Guanakito CHAPMAN,  LAURA        Attending Clinician [phone]

 

                    LAURA CALABRESE           Attending Clinician Unavailable

 

                    Angelica CHAVEZ             Attending Clinician Unavailable

 

                    AKILA               Admitting Clinician Unavailable

 

                    MD AKILA           Admitting Clinician Unavailable









Payers







           Payer Name Policy Type Policy Number Effective Date Expiration Date S

ource







Problems

This patient has no known problems.



Allergies, Adverse Reactions, Alerts







       Allergy Allergy Status Severity Reaction(s) Onset  Inactive Treating Comm

ents 

Source



       Name   Type                        Date   Date   Clinician        

 

       NO KNOWN Drug   Active                                           [facility]



       ALLERGIE Class                                                   [facility] of



       S                                                              [facility]







Social History







           Social Habit Start Date Stop Date  Quantity   Comments   Source

 

           Sex Assigned At Birth                                             [facility]

[facility]

 

           Ex[facility] SARS[facility]CoV-2                       Yes                   [facility]



           (event[facility]                                                [facility]









                Smoking Status  Start Date      Stop Date       Source

 

                Unknown if ever smoked                                 [facility]

y [facility]







Medications

This patient has no known medications.



Procedures

This patient has no known procedures.



Encounters







        Start   End     Encounter Admission Attending Care    Care    Encounter 

Source



        Date/Time Date/Time Type    Type    Clinicians Facility Department ID   

   

 

        2021 Outpatient         AALIYAH WIBLURN [facility]     021     [address]

[id number] [place]



        00:00:00 00:00:00                                         812     Method

i



                                                                        st

 

        2021-09-15 2021-09-15 Outpatient         AALIYAH WILBURN [facility]     2100

[id number] [place]



        00:00:00 00:00:00                                         451     Method

i



                                                                        st

 

        2021 Outpatient                 [facility]    7500   

 [facility]



        08:39:00 08:39:00                                                 

 

        2020 Laboratory         Only, [facility]    1.2.840.114 [id number]

[id number] 



        12:19:11 12:34:11 Only            Test    [place] 350.1.13.10         



                                                [place] 4.2.7.2.686         



                                                [place]  [phone]         



                                                        353             

 

        2020 Laboratory         Only, [facility] Test [facility]    1.2.840.

114 [id number] 

[facility]



        12:19:11 12:34:11 Only            CalabreseCuate sheth LAURA Keeley 350.1.13.10   

      ity [facility] 4.2.7.2.686         [facility]

s



                                                [place]  [phone]         [address]

 

        2020 Outpatient R       GUANAKITO  [facility]    [id number]

158 [facility]



        11:45:00 11:45:00                 CUATE                           ity [facility]

 

        2020 Letter          Meghan Dominguez    1.2.840.114 [id number]

[id number] 



        00:00:00 00:00:00 (Out)                   FRIEDA   350.1.13.10         



                                                [facility] 4.2.7.2.686         



                                                        [phone]         



                                                        019             

 

        2020 Letter          Meghan Dominguez    1.2.840.114 [id number]

[id number] [facility]



        00:00:00 00:00:00 (Out)                   FRIEDA   350.1.13.10         it

y of



                                                [facility] 4.2.7.2.686         [place]

as



                                                        [phone]         [address]







Results







           Test Description Test Time  Test Comments Results    Result Comments 

Source









                          SARS-CoV-2 (COVID-19) RNA [Presence] in Respiratory sp

ecimen by 2021-09-15 

16:02:14                                



                    RAMSES with probe detection                     









                      Test Item  Value      Reference Range Interpretation Comme

nts









             SARS-CoV-2 (COVID-19) RNA [Presence] in Respiratory Not detected No

t-Detected              



             specimen by RAMSES with probe detection (test code =                  

                      



             73546-4)                                            

 

             Whether patient is employed in a healthcare setting                

                        



             (test code = 32741-1)                                        

 

             Whether the patient has symptoms related to condition              

                          



             of interest (test code = 50345-1)                                  

      

 

             Patient was hospitalized because of this condition                 

                       



             (test code = 78975-8)                                        

 

             Whether the patient was admitted to intensive care unit            

                            



             (ICU) for condition of interest (test code = 65802-0)              

                          

 

             Whether patient resides in a congregate care setting               

                         



             (test code = 27035-2)

## 2021-12-08 ENCOUNTER — HOSPITAL ENCOUNTER (EMERGENCY)
Dept: HOSPITAL 97 - ER | Age: 86
Discharge: HOME | End: 2021-12-08
Payer: COMMERCIAL

## 2021-12-08 VITALS — TEMPERATURE: 97.7 F

## 2021-12-08 VITALS — DIASTOLIC BLOOD PRESSURE: 57 MMHG | SYSTOLIC BLOOD PRESSURE: 110 MMHG

## 2021-12-08 VITALS — OXYGEN SATURATION: 100 %

## 2021-12-08 DIAGNOSIS — R10.13: ICD-10-CM

## 2021-12-08 DIAGNOSIS — R07.9: Primary | ICD-10-CM

## 2021-12-08 DIAGNOSIS — I10: ICD-10-CM

## 2021-12-08 DIAGNOSIS — C90.00: ICD-10-CM

## 2021-12-08 DIAGNOSIS — Z79.01: ICD-10-CM
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
## 2021-12-08 LAB
ALBUMIN SERPL BCP-MCNC: 2.9 G/DL (ref 3.4–5)
ALP SERPL-CCNC: 62 U/L (ref 45–117)
ALT SERPL W P-5'-P-CCNC: 18 U/L (ref 12–78)
AST SERPL W P-5'-P-CCNC: 19 U/L (ref 15–37)
BUN BLD-MCNC: 26 MG/DL (ref 7–18)
GLUCOSE SERPLBLD-MCNC: 116 MG/DL (ref 74–106)
HCT VFR BLD CALC: 33.5 % (ref 39.6–49)
LIPASE SERPL-CCNC: 162 U/L (ref 73–393)
LYMPHOCYTES # SPEC AUTO: 1 K/UL (ref 0.7–4.9)
PMV BLD: 7.7 FL (ref 7.6–11.3)
POTASSIUM SERPL-SCNC: 4.1 MMOL/L (ref 3.5–5.1)
RBC # BLD: 3.26 M/UL (ref 4.33–5.43)

## 2021-12-08 PROCEDURE — 71250 CT THORAX DX C-: CPT

## 2021-12-08 PROCEDURE — 80076 HEPATIC FUNCTION PANEL: CPT

## 2021-12-08 PROCEDURE — 80048 BASIC METABOLIC PNL TOTAL CA: CPT

## 2021-12-08 PROCEDURE — 83690 ASSAY OF LIPASE: CPT

## 2021-12-08 PROCEDURE — 74176 CT ABD & PELVIS W/O CONTRAST: CPT

## 2021-12-08 PROCEDURE — 85025 COMPLETE CBC W/AUTO DIFF WBC: CPT

## 2021-12-08 PROCEDURE — 36415 COLL VENOUS BLD VENIPUNCTURE: CPT

## 2021-12-08 PROCEDURE — 99284 EMERGENCY DEPT VISIT MOD MDM: CPT

## 2021-12-08 NOTE — ER
Nurse's Notes                                                                                     

 [facility]                                                                 

Name: Zofia Rudd Jr                                                                              

Age: 86 yrs                                                                                       

Sex: Male                                                                                         

: 1935                                                                                   

MRN: [id number]                                                                                   

Arrival Date: 2021                                                                          

Time: 09:31                                                                                       

Account#: [id number]                                                                            

Bed 19                                                                                            

Private MD:                                                                                       

Diagnosis: Traumatic chest pain                                                                   

                                                                                                  

Presentation:                                                                                     

                                                                                             

10:04 Chief complaint: Patient states: Pt states he fell off a examination table at his Dr's  ll3 

      office and hit the cabnets when he fell about a month ago, states it only hurts when he     

      moves, states pain is 8/10. Coronavirus screen: Vaccine status: Patient reports             

      receiving the 2nd dose of the covid vaccine. Ebola Screen: No symptoms or risks             

      identified at this time. Initial Sepsis Screen: Does the patient meet any 2 criteria?       

      No. Patient's initial sepsis screen is negative. Does the patient have a suspected          

      source of infection? No. Patient's initial sepsis screen is negative. Risk Assessment:      

      Do you want to hurt yourself or someone else? Patient reports no desire to harm self or     

      others. Onset of symptoms was 2021.                                                

10:04 Method Of Arrival: Ambulatory                                                           ll3 

10:04 Acuity: SHARA 3                                                                           ll3 

                                                                                                  

Triage Assessment:                                                                                

11:56 General: Appears See triage.                                                            ll3 

11:57 General: Appears in no apparent distress. uncomfortable, Behavior is calm, cooperative. ll3 

      Pain: Complains of pain in left lateral anterior chest.                                     

                                                                                                  

Historical:                                                                                       

- Allergies:                                                                                      

10:41 No Known Allergies;                                                                     ll3 

- Home Meds:                                                                                      

10:11 amiodarone 100 mg Oral tab 1 tab once daily [Active]; amlodipine oral [Active]; Eliquis ll3 

      2.5 mg Oral tab 1 tab 2 times per day [Active]; Lasix 20 mg Oral tab 1 tab once daily       

      [Active]; Zofran 4 mg Oral tab 1 tab [Active]; valacyclovir Oral [Active]; Omeprazole       

      Oral [Active];                                                                              

- PMHx:                                                                                           

10:11 a-fib; GERD; Hypertension; multiple myeloma; chemotherapy;                              ll3 

- PSHx:                                                                                           

10:11 Cholecystectomy; cataract; Prostectomy;                                                 ll3 

                                                                                                  

- Immunization history:: Client reports receiving the 2nd dose of the Covid vaccine.              

- Social history:: Smoking status: Patient denies any tobacco usage or history of.                

                                                                                                  

                                                                                                  

Screening:                                                                                        

10:36 Abuse screen: Denies threats or abuse. Nutritional screening: No deficits noted.        ll3 

      Tuberculosis screening: No symptoms or risk factors identified. Fall Risk Fall in past      

      12 months (25 points). IV access (20 points). [place] Fall Scale indicates High         

      Risk Score (45 or more points). Fall prevention measures have been instituted. Side         

      Rails Up X 2 Family Present and informed to notify staff if the need to leave the           

      bedside.                                                                                    

                                                                                                  

Assessment:                                                                                       

10:04 General: See triage.                                                                    ll3 

11:15 Reassessment: Patient appears in no apparent distress at this time. No changes from     ll3 

      previously documented assessment. Patient and/or family updated on plan of care and         

      expected duration. Pain level reassessed. Patient is alert, oriented x 3, equal             

      unlabored respirations, skin warm/dry/pink.                                                 

                                                                                                  

Vital Signs:                                                                                      

10:04  / 58; Pulse 72; Resp 15; Temp 97.7(O); Pulse Ox 100% on R/A; Weight 88.45 kg     ll3 

      (R); Height 5 ft. 8 in. (172.72 cm) (R); Pain 8/10;                                         

11:00  / 56; Pulse 62; Resp 15; Pulse Ox 99% on R/A;                                    ll3 

11:56  / 58; Pulse 64; Resp 15; Pulse Ox 100% on R/A;                                   ll3 

13:00  / 57; Pulse 65; Resp 15; Pulse Ox 100% ;                                         ll3 

10:04 Body Mass Index 29.65 (88.45 kg, 172.72 cm)                                             ll3 

                                                                                                  

ED Course:                                                                                        

09:31 Patient arrived in ED.                                                                  ds1 

09:41 Jennifer Webb, RN is Primary Nurse.                                                    ll3 

09:47 Vikash Calabrese MD is Attending Physician.                                                sp3 

10:11 Triage completed.                                                                       ll3 

10:36 Patient has correct armband on for positive identification. Bed in low position. Call   ll3 

      light in reach. Side rails up X 1. Adult w/ patient.                                        

10:39 Inserted saline lock: 20 gauge in right forearm, using aseptic technique. Blood         ll3 

      collected.                                                                                  

11:39 Chest Abd Pelvis Wo Con In Process Unspecified.                                         EDMS

11:58 Arm band placed on.                                                                     ll3 

13:06 No provider procedures requiring assistance completed. IV discontinued, intact,         ll3 

      bleeding controlled, No redness/swelling at site. Pressure dressing applied.                

                                                                                                  

Administered Medications:                                                                         

No medications were administered                                                                  

                                                                                                  

                                                                                                  

Outcome:                                                                                          

12:52 Discharge ordered by MD.                                                                sp3 

13:06 Discharged to home ambulatory, with family.                                             ll3 

13:06 Condition: stable                                                                           

13:06 Discharge instructions given to patient, family, Instructed on discharge instructions,      

      follow up and referral plans. Demonstrated understanding of instructions, follow-up         

      care.                                                                                       

13:08 Patient left the ED.                                                                    ll3 

                                                                                                  

Signatures:                                                                                       

Dispatcher [employer]                           [facility]                                                 

Gabrielle Mcclellan                                ds1                                                  

Vikash Calabrese MD MD   sp3                                                  

Jennifer Webb RN                      RN   ll3                                                  

                                                                                                  

Corrections: (The following items were deleted from the chart)                                    

10:36 10:35 General: See triage. ll3                                                          ll3 

                                                                                                  

**************************************************************************************************

## 2021-12-08 NOTE — EDPHYS
Physician Documentation                                                                           

 [facility]                                                                 

Name: Zofia Rudd Jr                                                                              

Age: 86 yrs                                                                                       

Sex: Male                                                                                         

: 1935                                                                                   

MRN: [id number]                                                                                   

Arrival Date: 2021                                                                          

Time: 09:31                                                                                       

Account#: [id number]                                                                            

Bed 19                                                                                            

Private MD:                                                                                       

ED Physician Vikash Calabrese                                                                         

HPI:                                                                                              

                                                                                             

10:06 This 86 yrs old  Male presents to ER via [facility] with complaints of Fall     sp3 

      Injury.                                                                                     

10:06 86-year-old male with a history of multiple myeloma currently in remission and          sp3 

      hypertension now presents with diffuse anterior chest and upper abdominal pain after a      

      mechanical fall he sustained approximately 30 days ago as he slipped and fell onto a        

      cabinet and then onto the floor. Patient denies any head injury or neck pain or back        

      pain. Patient did let his primary doctor know about it who stated that he probably just     

      had a bruised rib and no further diagnostics were obtained. Due to the symptoms             

      continuing, his wife brought him to the ED for further evaluation..                         

                                                                                                  

Historical:                                                                                       

- Allergies:                                                                                      

10:41 No Known Allergies;                                                                     ll3 

- Home Meds:                                                                                      

10:11 amiodarone 100 mg Oral tab 1 tab once daily [Active]; amlodipine oral [Active]; Eliquis ll3 

      2.5 mg Oral tab 1 tab 2 times per day [Active]; Lasix 20 mg Oral tab 1 tab once daily       

      [Active]; Zofran 4 mg Oral tab 1 tab [Active]; valacyclovir Oral [Active]; Omeprazole       

      Oral [Active];                                                                              

- PMHx:                                                                                           

10:11 a-fib; GERD; Hypertension; multiple myeloma; chemotherapy;                              ll3 

- PSHx:                                                                                           

10:11 Cholecystectomy; cataract; Prostectomy;                                                 ll3 

                                                                                                  

- Immunization history:: Client reports receiving the 2nd dose of the Covid vaccine.              

- Social history:: Smoking status: Patient denies any tobacco usage or history of.                

                                                                                                  

                                                                                                  

ROS:                                                                                              

10:07 Constitutional: Negative for fever, chills, and weight loss, Eyes: Negative for injury, sp3 

      pain, redness, and discharge, ENT: Negative for injury, pain, and discharge, Neck:          

      Negative for injury, pain, and swelling, Respiratory: Negative for shortness of breath,     

      cough, wheezing, and pleuritic chest pain, MS/Extremity: Negative for injury and            

      deformity, Neuro: Negative for headache, weakness, numbness, tingling, and seizure.         

10:07 All other systems are negative.                                                             

                                                                                                  

Exam:                                                                                             

10:07 Constitutional:  This is a well developed, well nourished patient who is awake, alert,  sp3 

      and in no acute distress. Head/Face:  Normocephalic, atraumatic. Eyes:  Pupils equal        

      round and reactive to light, extra-ocular motions intact.  Lids and lashes normal.          

      Conjunctiva and sclera are non-icteric and not injected.  Cornea within normal limits.      

      Periorbital areas with no swelling, redness, or edema. ENT:  Nares patent. No nasal         

      discharge, no septal abnormalities noted.  External auditory canals are clear.              

      Oropharynx with no redness, swelling, or masses, exudates, or evidence of obstruction,      

      uvula midline.  Mucous membranes moist. Neck:  Trachea midline, no thyromegaly or           

      masses palpated, and no cervical lymphadenopathy.  Supple, full range of motion without     

      nuchal rigidity, or vertebral point tenderness.  No Meningismus. Cardiovascular:            

      Regular rate and rhythm with a normal S1 and S2.  No gallops, murmurs, or rubs.  Normal     

      PMI, no JVD.  No pulse deficits. Respiratory:  Lungs have equal breath sounds               

      bilaterally, clear to auscultation and percussion.  No rales, rhonchi or wheezes noted.     

       No increased work of breathing, no retractions or nasal flaring. Back:  No spinal          

      tenderness.  No costovertebral tenderness.  Full range of motion. Skin:  Warm, dry with     

      normal turgor.  Normal color with no rashes, no lesions, and no evidence of cellulitis.     

10:07 Chest/axilla: Patient has mild pain to rib palpation bilaterally and mild epigastric        

      pain on palpation.  There is no rebound or guarding in the abdomen.  There are no           

      visible signs of trauma or ecchymoses along the entire chest abdomen area..                 

                                                                                                  

Vital Signs:                                                                                      

10:04  / 58; Pulse 72; Resp 15; Temp 97.7(O); Pulse Ox 100% on R/A; Weight 88.45 kg     ll3 

      (R); Height 5 ft. 8 in. (172.72 cm) (R); Pain 8/10;                                         

11:00  / 56; Pulse 62; Resp 15; Pulse Ox 99% on R/A;                                    ll3 

11:56  / 58; Pulse 64; Resp 15; Pulse Ox 100% on R/A;                                   ll3 

13:00  / 57; Pulse 65; Resp 15; Pulse Ox 100% ;                                         ll3 

10:04 Body Mass Index 29.65 (88.45 kg, 172.72 cm)                                             ll3 

                                                                                                  

MDM:                                                                                              

10:04 Patient medically screened.                                                             sp3 

10:09 Data reviewed: vital signs, nurses notes. ED course: 86-year-old male with a fall 30    sp3 

      days ago now presents with chest and abdominal pain. Will obtain CT scan of the chest       

      abdomen and pelvis to determine any traumatic injury. Will discharge home if negative       

      to continue PCP follow-up..                                                                 

12:51 ED course: Patient's labs are consistent with chronic kidney disease. CT demonstrates   sp3 

      no traumatic injury we will discharge patient home at this time..                           

                                                                                                  

                                                                                             

10:12 Order name: Basic Metabolic Panel; Complete Time: 12:50                                 sp3 

                                                                                             

10:12 Order name: CBC with Diff; Complete Time: 12:50                                         sp3 

                                                                                             

10:12 Order name: Hepatic Function; Complete Time: 12:50                                      sp3 

                                                                                             

10:12 Order name: Lipase; Complete Time: 12:50                                                sp3 

                                                                                             

11:33 Order name: Chest Abd Pelvis Wo Con; Complete Time: 12:50                               EDMS

                                                                                             

10:12 Order name: IV Saline Lock; Complete Time: 10:42                                        sp3 

                                                                                             

10:12 Order name: Labs collected and sent; Complete Time: 10:42                               sp3 

                                                                                                  

Administered Medications:                                                                         

No medications were administered                                                                  

                                                                                                  

                                                                                                  

Disposition Summary:                                                                              

21 12:52                                                                                    

Discharge Ordered                                                                                 

      Location: Home                                                                          sp3 

      Condition: Stable                                                                       sp3 

      Diagnosis                                                                                   

        - Traumatic chest pain                                                                sp3 

      Followup:                                                                               sp3 

        - With: Private Physician                                                                  

        - When: As needed                                                                          

        - Reason: Recheck today's complaints                                                       

      Discharge Instructions:                                                                     

        - Discharge Summary Sheet                                                             sp3 

        - Blunt Chest Trauma                                                                  sp3 

      Forms:                                                                                      

        - Medication Reconciliation Form                                                      sp3 

        - Thank You Letter                                                                    sp3 

        - Antibiotic Education                                                                sp3 

        - Prescription Opioid Use                                                             sp3 

Signatures:                                                                                       

Dispatcher [employer]                           EDMS                                                 

Vikash Calabrese MD MD   sp3                                                  

Jennifer Webb RN                      RN   ll3                                                  

                                                                                                  

Corrections: (The following items were deleted from the chart)                                    

11:33 10:13 Chest Abdomen Pelvis W Con+CT.RAD.BRZ ordered. EDMS                               EDMS

                                                                                                  

**************************************************************************************************

## 2021-12-08 NOTE — RAD REPORT
EXAM DESCRIPTION:  CT - Chest Abd Pelvis Wo Con - 12/8/2021 11:43 am

 

CLINICAL HISTORY:   Fall with chest and abdominal pain

 

 

TECHNIQUE:  Computed axial tomography of the chest, abdomen and pelvis was obtained. Oral contrast wa
s given. IV contrast was not requested.

 

All CT scans are performed using dose optimization technique as appropriate and may include automated
 exposure control or mA/KV adjustment according to patient size.

 

FINDINGS:   The evaluation of mediastinum, claudia, vessels and solid organs is limited secondary to the
 lack of IV contrast administration

 

 A lung contusion is not present.

 

A pleural effusion is not present. A pericardial effusion is not seen.

 

A small fluid collection is present within the anterior to the inferior aspect of the heart.

 

Moderate hiatal hernia

 

The liver, spleen, pancreas, adrenals, kidneys and bladder do not demonstrate a traumatic injury.

 

There is no evidence of diverticulitis.

 

Renal cystic masses are incompletely evaluated on this exam. Small to moderate left and small right i
nguinal hernias contain fat

 

IMPRESSION:  Small fluid collection within the fat anterior to the lower heart is of uncertain signif
icance. If this is the site of injury then an enhanced CT scan of the chest would be recommended for 
further evaluation.

 

No traumatic injury involving the abdomen/ pelvis seen.